# Patient Record
Sex: MALE | Race: WHITE | NOT HISPANIC OR LATINO | Employment: FULL TIME | URBAN - METROPOLITAN AREA
[De-identification: names, ages, dates, MRNs, and addresses within clinical notes are randomized per-mention and may not be internally consistent; named-entity substitution may affect disease eponyms.]

---

## 2019-01-18 ENCOUNTER — APPOINTMENT (OUTPATIENT)
Dept: CT IMAGING | Facility: CLINIC | Age: 49
End: 2019-01-18
Payer: COMMERCIAL

## 2019-01-18 ENCOUNTER — HOSPITAL ENCOUNTER (INPATIENT)
Facility: CLINIC | Age: 49
LOS: 10 days | Discharge: HOME | End: 2019-01-28
Attending: PSYCHIATRY & NEUROLOGY | Admitting: PSYCHIATRY & NEUROLOGY
Payer: COMMERCIAL

## 2019-01-18 ENCOUNTER — HOSPITAL ENCOUNTER (EMERGENCY)
Facility: CLINIC | Age: 49
Discharge: PSYCHIATRIC OR SUBSTANCE USE FACILITY | End: 2019-01-18
Attending: EMERGENCY MEDICINE | Admitting: EMERGENCY MEDICINE
Payer: COMMERCIAL

## 2019-01-18 VITALS
HEIGHT: 70 IN | TEMPERATURE: 97 F | DIASTOLIC BLOOD PRESSURE: 105 MMHG | OXYGEN SATURATION: 97 % | BODY MASS INDEX: 29.06 KG/M2 | HEART RATE: 80 BPM | WEIGHT: 203 LBS | SYSTOLIC BLOOD PRESSURE: 150 MMHG | RESPIRATION RATE: 16 BRPM

## 2019-01-18 DIAGNOSIS — F33.9 EPISODE OF RECURRENT MAJOR DEPRESSIVE DISORDER, UNSPECIFIED DEPRESSION EPISODE SEVERITY (H): Primary | ICD-10-CM

## 2019-01-18 DIAGNOSIS — F06.1 CATATONIA: ICD-10-CM

## 2019-01-18 DIAGNOSIS — F41.9 ANXIETY: ICD-10-CM

## 2019-01-18 PROBLEM — F32.A DEPRESSION: Status: ACTIVE | Noted: 2019-01-18

## 2019-01-18 LAB
ALBUMIN SERPL-MCNC: 4.1 G/DL (ref 3.4–5)
ALP SERPL-CCNC: 84 U/L (ref 40–150)
ALT SERPL W P-5'-P-CCNC: 59 U/L (ref 0–70)
AMPHETAMINES UR QL SCN: NEGATIVE
ANION GAP SERPL CALCULATED.3IONS-SCNC: 7 MMOL/L (ref 3–14)
AST SERPL W P-5'-P-CCNC: 30 U/L (ref 0–45)
BARBITURATES UR QL: NEGATIVE
BASOPHILS # BLD AUTO: 0 10E9/L (ref 0–0.2)
BASOPHILS NFR BLD AUTO: 0.4 %
BENZODIAZ UR QL: NEGATIVE
BILIRUB SERPL-MCNC: 0.7 MG/DL (ref 0.2–1.3)
BUN SERPL-MCNC: 15 MG/DL (ref 7–30)
CALCIUM SERPL-MCNC: 9.2 MG/DL (ref 8.5–10.1)
CANNABINOIDS UR QL SCN: POSITIVE
CHLORIDE SERPL-SCNC: 107 MMOL/L (ref 94–109)
CO2 SERPL-SCNC: 28 MMOL/L (ref 20–32)
COCAINE UR QL: NEGATIVE
CREAT SERPL-MCNC: 0.91 MG/DL (ref 0.66–1.25)
DIFFERENTIAL METHOD BLD: NORMAL
EOSINOPHIL # BLD AUTO: 0 10E9/L (ref 0–0.7)
EOSINOPHIL NFR BLD AUTO: 0.4 %
ERYTHROCYTE [DISTWIDTH] IN BLOOD BY AUTOMATED COUNT: 13.2 % (ref 10–15)
GFR SERPL CREATININE-BSD FRML MDRD: >90 ML/MIN/{1.73_M2}
GLUCOSE SERPL-MCNC: 114 MG/DL (ref 70–99)
HCT VFR BLD AUTO: 44.4 % (ref 40–53)
HGB BLD-MCNC: 14.7 G/DL (ref 13.3–17.7)
IMM GRANULOCYTES # BLD: 0 10E9/L (ref 0–0.4)
IMM GRANULOCYTES NFR BLD: 0.4 %
LYMPHOCYTES # BLD AUTO: 1.1 10E9/L (ref 0.8–5.3)
LYMPHOCYTES NFR BLD AUTO: 15.1 %
MCH RBC QN AUTO: 31.3 PG (ref 26.5–33)
MCHC RBC AUTO-ENTMCNC: 33.1 G/DL (ref 31.5–36.5)
MCV RBC AUTO: 95 FL (ref 78–100)
MONOCYTES # BLD AUTO: 0.8 10E9/L (ref 0–1.3)
MONOCYTES NFR BLD AUTO: 11.4 %
NEUTROPHILS # BLD AUTO: 5.1 10E9/L (ref 1.6–8.3)
NEUTROPHILS NFR BLD AUTO: 72.3 %
NRBC # BLD AUTO: 0 10*3/UL
NRBC BLD AUTO-RTO: 0 /100
OPIATES UR QL SCN: NEGATIVE
PCP UR QL SCN: NEGATIVE
PLATELET # BLD AUTO: 208 10E9/L (ref 150–450)
POTASSIUM SERPL-SCNC: 3.9 MMOL/L (ref 3.4–5.3)
PROT SERPL-MCNC: 8 G/DL (ref 6.8–8.8)
RBC # BLD AUTO: 4.7 10E12/L (ref 4.4–5.9)
SODIUM SERPL-SCNC: 142 MMOL/L (ref 133–144)
WBC # BLD AUTO: 7 10E9/L (ref 4–11)

## 2019-01-18 PROCEDURE — 99285 EMERGENCY DEPT VISIT HI MDM: CPT | Mod: 25

## 2019-01-18 PROCEDURE — 90791 PSYCH DIAGNOSTIC EVALUATION: CPT

## 2019-01-18 PROCEDURE — 12400001 ZZH R&B MH UMMC

## 2019-01-18 PROCEDURE — 70450 CT HEAD/BRAIN W/O DYE: CPT

## 2019-01-18 PROCEDURE — 80307 DRUG TEST PRSMV CHEM ANLYZR: CPT | Performed by: EMERGENCY MEDICINE

## 2019-01-18 PROCEDURE — 25000132 ZZH RX MED GY IP 250 OP 250 PS 637: Performed by: STUDENT IN AN ORGANIZED HEALTH CARE EDUCATION/TRAINING PROGRAM

## 2019-01-18 PROCEDURE — 85025 COMPLETE CBC W/AUTO DIFF WBC: CPT | Performed by: EMERGENCY MEDICINE

## 2019-01-18 PROCEDURE — 80053 COMPREHEN METABOLIC PANEL: CPT | Performed by: EMERGENCY MEDICINE

## 2019-01-18 RX ORDER — LORAZEPAM 1 MG/1
1 TABLET ORAL EVERY 8 HOURS PRN
Status: DISCONTINUED | OUTPATIENT
Start: 2019-01-18 | End: 2019-01-18 | Stop reason: HOSPADM

## 2019-01-18 RX ORDER — ACETAMINOPHEN 325 MG/1
650 TABLET ORAL EVERY 4 HOURS PRN
Status: DISCONTINUED | OUTPATIENT
Start: 2019-01-18 | End: 2019-01-18 | Stop reason: HOSPADM

## 2019-01-18 RX ORDER — TRAZODONE HYDROCHLORIDE 50 MG/1
50 TABLET, FILM COATED ORAL
Status: DISCONTINUED | OUTPATIENT
Start: 2019-01-18 | End: 2019-01-28 | Stop reason: HOSPADM

## 2019-01-18 RX ORDER — SERTRALINE HYDROCHLORIDE 100 MG/1
200 TABLET, FILM COATED ORAL EVERY MORNING
Status: ON HOLD | COMMUNITY
End: 2019-01-28

## 2019-01-18 RX ORDER — ALUMINA, MAGNESIA, AND SIMETHICONE 2400; 2400; 240 MG/30ML; MG/30ML; MG/30ML
30 SUSPENSION ORAL EVERY 4 HOURS PRN
Status: DISCONTINUED | OUTPATIENT
Start: 2019-01-18 | End: 2019-01-28 | Stop reason: HOSPADM

## 2019-01-18 RX ORDER — ACETAMINOPHEN 325 MG/1
650 TABLET ORAL EVERY 4 HOURS PRN
Status: DISCONTINUED | OUTPATIENT
Start: 2019-01-18 | End: 2019-01-28 | Stop reason: HOSPADM

## 2019-01-18 RX ORDER — OLANZAPINE 10 MG/2ML
10 INJECTION, POWDER, FOR SOLUTION INTRAMUSCULAR
Status: DISCONTINUED | OUTPATIENT
Start: 2019-01-18 | End: 2019-01-24

## 2019-01-18 RX ORDER — SERTRALINE HYDROCHLORIDE 100 MG/1
200 TABLET, FILM COATED ORAL EVERY MORNING
Status: DISCONTINUED | OUTPATIENT
Start: 2019-01-19 | End: 2019-01-28 | Stop reason: HOSPADM

## 2019-01-18 RX ORDER — OLANZAPINE 10 MG/1
10 TABLET ORAL
Status: DISCONTINUED | OUTPATIENT
Start: 2019-01-18 | End: 2019-01-24

## 2019-01-18 RX ORDER — MULTIVITAMIN,THERAPEUTIC
1 TABLET ORAL DAILY
Status: ON HOLD | COMMUNITY
End: 2019-01-28

## 2019-01-18 RX ORDER — LORAZEPAM 2 MG/1
2 TABLET ORAL ONCE
Status: COMPLETED | OUTPATIENT
Start: 2019-01-18 | End: 2019-01-18

## 2019-01-18 RX ORDER — MULTIVITAMIN,THERAPEUTIC
1 TABLET ORAL DAILY
Status: DISCONTINUED | OUTPATIENT
Start: 2019-01-19 | End: 2019-01-28 | Stop reason: HOSPADM

## 2019-01-18 RX ORDER — HYDROXYZINE HYDROCHLORIDE 25 MG/1
25 TABLET, FILM COATED ORAL EVERY 4 HOURS PRN
Status: DISCONTINUED | OUTPATIENT
Start: 2019-01-18 | End: 2019-01-28 | Stop reason: HOSPADM

## 2019-01-18 RX ADMIN — LORAZEPAM 2 MG: 2 TABLET ORAL at 22:37

## 2019-01-18 ASSESSMENT — ENCOUNTER SYMPTOMS
NAUSEA: 0
DECREASED CONCENTRATION: 1
HEADACHES: 0
VOMITING: 0
NERVOUS/ANXIOUS: 1
SLEEP DISTURBANCE: 1
APPETITE CHANGE: 1
ABDOMINAL PAIN: 0

## 2019-01-18 ASSESSMENT — MIFFLIN-ST. JEOR
SCORE: 1802.05
SCORE: 1795.25

## 2019-01-18 NOTE — PHARMACY-ADMISSION MEDICATION HISTORY
Admission medication history interview status for the 1/18/2019  admission is complete. See EPIC admission navigator for prior to admission medications     Medication history source reliability:Good    Actions taken by pharmacist (provider contacted, etc): Interviewed patient and patient's wife.     Additional medication history information not noted on PTA med list : Only on sertraline daily for prescription medications. Previously has tried bupropion and escitalopram.     Medication reconciliation/reorder completed by provider prior to medication history? No    Time spent in this activity: 15 min    Prior to Admission medications    Medication Sig Last Dose Taking? Auth Provider   multivitamin, therapeutic (THERA-VIT) TABS tablet Take 1 tablet by mouth daily 1/17/2019 at am Yes Unknown, Entered By History   sertraline (ZOLOFT) 100 MG tablet Take 200 mg by mouth every morning  1/17/2019 at am Yes Reported, Patient     Toyin Velázquezsmith, PharmD   [phone]

## 2019-01-18 NOTE — ED PROVIDER NOTES
"  History     Chief Complaint:  Anxiety    HPI   Of note, on my evaluation, patient was unable to collect thoughts to form a sentence appropriately, thus a majority of history obtained from wife.      Jorge Diana is a 48 year old male, with diagnoses of anxiety and depression, who presents with his wife to the emergency department for evaluation of increased anxiety over the last 2-3 days. Patient reports that he presents today as he would \"like to get myself back to straight\" and that he is \"scared for my family and myself because of marijuana use\". Spouse noted that patient has been having major lows, despite being on Zoloft, where it gets to the point where he \"can't speak or interact\". She notes that patient for the last 2-3 days has been \"just standing and doing nothing at home\". Spouse noted that patient has had extreme anxiety, to the severity that he is sweating and unable to do simple tasks like walking the dog. Spouse noted that patient is currently on a leave of absence from work as he had an episode around [place] time and is due to start a back-up job in 3 days, though patient noted to wife today that he \"can't do it\". Spouse noted that patient has had decreased appetite and difficulty sleeping, though denies any abdominal pain, vomiting, nausea, chest pain or headache. Of note, spouse reported that patient is being followed by [facility] and patient has been set up with a psychologist, whom he has had one visit with and a psychiatrist. She also reported that patient is going to be tested for bipolar. Spouse reports that patient has been having issues making decisions and notes that he has difficulty putting together his sentences. He denies any suicidal or homicidal ideation.    Allergies:  Amoxicillin  Penicillins     Medications:    Sertraline  Percocet  Zoloft    Past Medical History:    Fatigue  Depression  Anxiety  Unilateral inguinal hernia    Past Surgical History:  " "  Herniorrhaphy umbilical  Laparoscopic herniorrhaphy inguinal bilateral  Mouth surgery - wisdom teeth extraction    Family History:    The patient denies any relevant family medical history.     Social History:  The patient was accompanied to the ED by spouse.  Smoking Status: Yes - current some day smoker  Smokeless Tobacco: No  Alcohol Use: Yes  Drug Use: No   Marital Status:   [2]     Review of Systems   Constitutional: Positive for appetite change.   Cardiovascular: Negative for chest pain.   Gastrointestinal: Negative for abdominal pain, nausea and vomiting.   Neurological: Negative for headaches.   Psychiatric/Behavioral: Positive for decreased concentration and sleep disturbance. Negative for suicidal ideas. The patient is nervous/anxious.         Denies homicidal ideation.   All other systems reviewed and are negative.      Physical Exam   Vitals:  Patient Vitals for the past 24 hrs:   BP Temp Temp src Heart Rate Resp SpO2 Height Weight   01/18/19 1124 (!) 154/106 97  F (36.1  C) Temporal 80 16 98 % 1.778 m (5' 10\") 92.1 kg (203 lb)        Physical Exam  Vitals: reviewed by me  General: Pt seen on [facility], cooperative, and alert to conversation  Eyes: Tracking well, clear conjunctiva BL  ENT: MMM, midline trachea.   Lungs:  No tachypnea, no accessory muscle use. No respiratory distress.   CV: Rate as above, regular rhythm.    Abd: Soft, non tender, no guarding, no rebound. Non distended  MSK: no peripheral edema or joint effusion.  No evidence of trauma  Skin: No rash, normal turgor and temperature  Neuro: Clear speech and no facial droop.  BUE and BLE with SILT and 5/5 motor  Psych: Not RIS, no e/o AH/VH.  A very odd affect, delay in response time, paucity of speech noted, stares off into space frequently.    Emergency Department Course     Imaging:  Radiology findings were communicated with the patient and family who voiced understanding of the findings.  CT Head w/o " Contrast:  IMPRESSION:  Normal head CT.   Reading per radiology.     Laboratory:  Laboratory findings were communicated with the patient and family who voiced understanding of the findings.  CBC: AWNL (WBC 7.0, HGB 14.7, )  CMP: Glucose 114 (H) o/w WNL (Creatinine 0.91)    Drug Abuse Screen 77 Urine: Cannabinoids Positive (A) o/w WNL    Interventions:  Medications - No data to display     Emergency Department Course:  Nursing notes and vitals reviewed.  IV was inserted and blood was drawn for laboratory testing, results above.  The patient provided a urine sample here in the emergency department. This was sent for laboratory testing, findings above.  The patient was sent for a CT Head w/o Contrast while in the emergency department, results above.      11:40 AM: I performed an exam of the patient as documented above. History obtained from patient and spouse.   11:48 AM: Spouse and patient consent to imaging and lab studies as I do not think anxiety is the sole cause.    I personally reviewed the laboratory results with the Patient and spouse and answered all related questions prior to discharge.    Impression & Plan      Medical Decision Makin year-old male who presents to the emergency room with anxiety, catatonia and some form of altered mental status. I believe that all his altered mental status has to do with being in a current depressive mood, possibly depressive mood with psychotic features. He is alert and oriented to person, place, but mistaken on time. Apparently he has been smoking much marijuana and staring off into space far more than normal. His brief medical work up is negative here, including normal labs and CT scan of the head. He has a normal neurologic exam when he chooses to participate in the exam. I've discussed the case with the mental health professionals here, and we agreed that patient would benefit from admission to the hospital.    2:18 PM  Patient signed out to Dr. Chacko.   Patient is on a health officer hold right now, given his debilitating illness, and is medically cleared.  Awaiting bed.  Order set is in.  Paperwork filled out.    Diagnosis:    ICD-10-CM    1. Catatonia F06.1    2. Anxiety F41.9         Disposition:   Admitted     Discharge Medications:     Medication List      There are no discharge medications for this visit.         Scribe Disclosure:  I, Meme Brar, am serving as a scribe at 11:49 AM on 1/18/2019 to document services personally performed by Ralf Calderon*, based on my observations and the provider's statements to me.  1/18/2019    EMERGENCY DEPARTMENT       Ralf Calderon MD  01/18/19 2154

## 2019-01-18 NOTE — ED NOTES
"Patient report received. Patient lying in bed. Patient sat up and looked at RN when entering room. He states that he is feeling \"anxious about life decisions\". Patient reports no pain at this time, breathing is regular and non-labored.  Patient states he would like something to eat, given meal tray.  Patient offered blanket, accepted and lights dimmed for comfort.        "

## 2019-01-19 LAB
ALBUMIN UR-MCNC: 10 MG/DL
APPEARANCE UR: CLEAR
BACTERIA #/AREA URNS HPF: ABNORMAL /HPF
BILIRUB UR QL STRIP: NEGATIVE
CHOLEST SERPL-MCNC: 280 MG/DL
CK SERPL-CCNC: 137 U/L (ref 30–300)
COLOR UR AUTO: YELLOW
FOLATE SERPL-MCNC: 19.2 NG/ML
GLUCOSE UR STRIP-MCNC: 30 MG/DL
HDLC SERPL-MCNC: 51 MG/DL
HGB UR QL STRIP: NEGATIVE
HYALINE CASTS #/AREA URNS LPF: 2 /LPF (ref 0–2)
KETONES UR STRIP-MCNC: 5 MG/DL
LDLC SERPL CALC-MCNC: 201 MG/DL
LEUKOCYTE ESTERASE UR QL STRIP: NEGATIVE
MAGNESIUM SERPL-MCNC: 2.2 MG/DL (ref 1.6–2.3)
MUCOUS THREADS #/AREA URNS LPF: PRESENT /LPF
NITRATE UR QL: NEGATIVE
NONHDLC SERPL-MCNC: 229 MG/DL
PH UR STRIP: 6 PH (ref 5–7)
PHOSPHATE SERPL-MCNC: 2.9 MG/DL (ref 2.5–4.5)
RBC #/AREA URNS AUTO: 1 /HPF (ref 0–2)
SOURCE: ABNORMAL
SP GR UR STRIP: 1.03 (ref 1–1.03)
SQUAMOUS #/AREA URNS AUTO: <1 /HPF (ref 0–1)
TRIGL SERPL-MCNC: 141 MG/DL
TSH SERPL DL<=0.005 MIU/L-ACNC: 0.82 MU/L (ref 0.4–4)
UROBILINOGEN UR STRIP-MCNC: NORMAL MG/DL (ref 0–2)
VIT B12 SERPL-MCNC: 360 PG/ML (ref 193–986)
WBC #/AREA URNS AUTO: <1 /HPF (ref 0–5)

## 2019-01-19 PROCEDURE — 82746 ASSAY OF FOLIC ACID SERUM: CPT | Performed by: STUDENT IN AN ORGANIZED HEALTH CARE EDUCATION/TRAINING PROGRAM

## 2019-01-19 PROCEDURE — 82306 VITAMIN D 25 HYDROXY: CPT | Performed by: STUDENT IN AN ORGANIZED HEALTH CARE EDUCATION/TRAINING PROGRAM

## 2019-01-19 PROCEDURE — 84443 ASSAY THYROID STIM HORMONE: CPT | Performed by: STUDENT IN AN ORGANIZED HEALTH CARE EDUCATION/TRAINING PROGRAM

## 2019-01-19 PROCEDURE — 12400001 ZZH R&B MH UMMC

## 2019-01-19 PROCEDURE — 36415 COLL VENOUS BLD VENIPUNCTURE: CPT | Performed by: STUDENT IN AN ORGANIZED HEALTH CARE EDUCATION/TRAINING PROGRAM

## 2019-01-19 PROCEDURE — 83735 ASSAY OF MAGNESIUM: CPT | Performed by: STUDENT IN AN ORGANIZED HEALTH CARE EDUCATION/TRAINING PROGRAM

## 2019-01-19 PROCEDURE — 82550 ASSAY OF CK (CPK): CPT | Performed by: PSYCHIATRY & NEUROLOGY

## 2019-01-19 PROCEDURE — 25000132 ZZH RX MED GY IP 250 OP 250 PS 637: Performed by: STUDENT IN AN ORGANIZED HEALTH CARE EDUCATION/TRAINING PROGRAM

## 2019-01-19 PROCEDURE — 36415 COLL VENOUS BLD VENIPUNCTURE: CPT | Performed by: PSYCHIATRY & NEUROLOGY

## 2019-01-19 PROCEDURE — 25000132 ZZH RX MED GY IP 250 OP 250 PS 637: Performed by: PSYCHIATRY & NEUROLOGY

## 2019-01-19 PROCEDURE — 80061 LIPID PANEL: CPT | Performed by: STUDENT IN AN ORGANIZED HEALTH CARE EDUCATION/TRAINING PROGRAM

## 2019-01-19 PROCEDURE — 84100 ASSAY OF PHOSPHORUS: CPT | Performed by: STUDENT IN AN ORGANIZED HEALTH CARE EDUCATION/TRAINING PROGRAM

## 2019-01-19 PROCEDURE — 82607 VITAMIN B-12: CPT | Performed by: STUDENT IN AN ORGANIZED HEALTH CARE EDUCATION/TRAINING PROGRAM

## 2019-01-19 PROCEDURE — 81001 URINALYSIS AUTO W/SCOPE: CPT | Performed by: STUDENT IN AN ORGANIZED HEALTH CARE EDUCATION/TRAINING PROGRAM

## 2019-01-19 PROCEDURE — 99223 1ST HOSP IP/OBS HIGH 75: CPT | Mod: AI | Performed by: PSYCHIATRY & NEUROLOGY

## 2019-01-19 RX ORDER — LORAZEPAM 1 MG/1
1-2 TABLET ORAL EVERY 4 HOURS PRN
Status: DISCONTINUED | OUTPATIENT
Start: 2019-01-19 | End: 2019-01-28 | Stop reason: HOSPADM

## 2019-01-19 RX ADMIN — LORAZEPAM 2 MG: 1 TABLET ORAL at 11:25

## 2019-01-19 RX ADMIN — OLANZAPINE 10 MG: 10 TABLET, FILM COATED ORAL at 20:37

## 2019-01-19 RX ADMIN — SERTRALINE HYDROCHLORIDE 200 MG: 100 TABLET ORAL at 09:58

## 2019-01-19 RX ADMIN — THERA TABS 1 TABLET: TAB at 09:58

## 2019-01-19 ASSESSMENT — ACTIVITIES OF DAILY LIVING (ADL)
ORAL_HYGIENE: INDEPENDENT
HYGIENE/GROOMING: INDEPENDENT
DRESS: INDEPENDENT

## 2019-01-19 NOTE — ED NOTES
[facility] here for transport. Report given and patient loaded up into EMS truck.

## 2019-01-19 NOTE — PROGRESS NOTES
Patient 49 yo  male transferred to station [facility] where he presented with catatonic-like symptoms.  Pt reportedly unable to hold conversation, replying with delayed kimmy answers.  (Pt continues thus far here.)   Pt's wife reports pt having similar episode in Dec. And has been on leave from work since.  (Pt's Zoloft increased at that time with positive effects.)   Wife also reports patient using cannabis for couple years for anxiety.          Pt cooperative and directable; albeit, with staff urging.   Pt reports not having any SI/SIB, but very delayed with answer.  Pt took 2mg Ativan per doctor's order.  Pt able to swallow without difficulty, but delayed with action.

## 2019-01-19 NOTE — PROGRESS NOTES
"  Initial Psychosocial Assessment    I have reviewed the chart, met with the patient, and developed Care Plan. Information for assessment was obtained from: Pt, medical record                                                                  voluntary    Presenting Problem:   Per ED:   Jorge Diana is a 48 year old male, with diagnoses of anxiety and depression, who presents with his wife to the emergency department for evaluation of increased anxiety over the last 2-3 days. Patient reports that he presents today as he would \"like to get myself back to straight\" and that he is \"scared for my family and myself because of marijuana use\". Spouse noted that patient has been having major lows, despite being on Zoloft, where it gets to the point where he \"can't speak or interact\". She notes that patient for the last 2-3 days has been \"just standing and doing nothing at home\". Spouse noted that patient has had extreme anxiety, to the severity that he is sweating and unable to do simple tasks like walking the dog. Spouse noted that patient is currently on a leave of absence from work as he had an episode around [place] time and is due to start a back-up job in 3 days, though patient noted to wife today that he \"can't do it\". Spouse noted that patient has had decreased appetite and difficulty sleeping, though denies any abdominal pain, vomiting, nausea, chest pain or headache. Of note, spouse reported that patient is being followed by [facility] and patient has been set up with a psychologist, whom he has had one visit with and a psychiatrist. She also reported that patient is going to be tested for bipolar. Spouse reports that patient has been having issues making decisions and notes that he has difficulty putting together his sentences. He denies any suicidal or homicidal ideation.    Writer met with pt and his wife during visiting hours. Pt was given ativan with improvement. He was able to answer questions " readily. His wife reports pt is doing much better then at the time of admission.       History of Mental Health and Chemical Dependency:  This is pt's first hospitalization    marijuana use    Significant Life Events   (Illness, Abuse, Trauma, Death): not assessed    Family: raised in [place] with intact family, has one brother,  with two children.    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 CTC to contact pt's wife for additional information and meet with pt to discuss discharge plans. Staff to provide safe environment and provide a therapeutic milieu.

## 2019-01-19 NOTE — H&P
"Admitted:     01/18/2019      CONTACTS:  Andreina Infante at [facility] and associate is the patient's prescriber.      CHIEF COMPLAINT:  \"I am scared.\"      HISTORY OF PRESENT ILLNESS:  History obtained from patient interview and chart review.      The patient is a 48-year-old male with past medical history of rather uncomplicated major depressive disorder, and possibly anxiety disorder who presented on 01/18/2019 with worsening anxiety, the year, and motor symptoms suggestive of catatonia in the setting of daily marijuana use, which is not uncommon for the patient.      Review of records shows that the patient presented to the ED at [facility] with his wife.  Most of the history was obtained from the patient's wife.  She reports that the patient has been increasingly anxious of the past 2-3 days.  She reported how the patient has been having periods lately where he \"cannot speak or interact.\"  For the last 2 days, the patient has been \"just standing and doing nothing at home.\"  He has also had extreme anxiety where he is unable to complete any tasks.  He has also difficulty sleeping and decreased appetite.  Apparently, the patient was going to be tested for bipolar disorder.  She notes overall that the patient is having difficulty putting sentences together.      Also reports that she feels like the patient has had much milder episodes like this over the past 2 years, in which he would have some episodes of staring, or be a bit slow to respond to questions, and this may go on for a few hours, even a day.  In December, he experienced these symptoms for 3-4 days.  At that time his psych provider increased his Zoloft to 200 mg per day, which seemed to help.  The patient's most recent symptoms began about 3 days ago and have worsened to the point where he is unable to converse or function.      Upon interview of the patient, the patient was found to be in his room standing, filling out a menu.  The patient was rather " stationary and was able to briefly converse, taking moments where he would pause and not respond.  The patient had increased latency in between his responses.  When questioned, patient often would not respond.  The patient was able to say that he was scared and anxious.  The patient was also oriented at this time.  The patient was able to follow simple or motor commands.  Engaged in testing for catatonia with the patient having elements suggestive of catatonia.  The patient unable to vocalize any immediate concerns at this point.  Informed the patient that medications will be used at this time.  The patient consents to these medications, namely Ativan.      The risks, benefits, alternatives and side effects discussed and understood by the patient and other caregivers.      PSYCHIATRIC NERVOUS SYSTEM:  Positive for anxiety, fear, insomnia, reduced activity, fixed position, mannerisms, delayed speech, thought blocking, and difficulty engaging in conversation.      MEDICAL REVIEW OF SYSTEMS:  Review of systems is negative, other than noted in HPI.      PSYCHIATRIC HISTORY:  The patient has prior diagnosis of major depressive disorder, and a possible anxiety disorder.  The patient has no prior hospitalizations.  It does not like the patient has any prior suicide attempt, self-injurious behavior.  Unclear which medications the patient has tried in the past, with his most current medication being Zoloft.  It is quite possible that this is the only antidepressant he has tried in the past.      SUBSTANCE USE HISTORY:  The patient has smoked marijuana on a frequent basis for the past few years.  He had smoked prior to his symptoms most recently occurring.  He uses this medication to assist with anxiety.  It does not appear the patient uses any other substances at this time.  No history of chemical dependency treatment.      PAST MEDICAL HISTORY:  Fatigue and unilateral inguinal hernia.      PAST SURGICAL HISTORY:     1.   Herniorrhaphy, umbilical.    2.  Laparoscopic herniorrhaphy, inguinal, bilateral.    3.  Mouth surgery, wisdom teeth extraction.      ALLERGIES:  PENICILLIN, REACTION IS ITCHING AND RASH.      MEDICATIONS:  No current outpatient medications on file.      SOCIAL HISTORY:  The patient works full-time.  The patient is .  The patient is currently on leave of absence from his work.  No other available social history at this time.      FAMILY HISTORY:  No family history on file.        LABORATORY DATA:       Results for orders placed or performed during the hospital encounter of 01/18/19 (from the past 24 hour(s))   CBC with platelets differential   Result Value Ref Range    WBC 7.0 4.0 - 11.0 10e9/L    RBC Count 4.70 4.4 - 5.9 10e12/L    Hemoglobin 14.7 13.3 - 17.7 g/dL    Hematocrit 44.4 40.0 - 53.0 %    MCV 95 78 - 100 fl    MCH 31.3 26.5 - 33.0 pg    MCHC 33.1 31.5 - 36.5 g/dL    RDW 13.2 10.0 - 15.0 %    Platelet Count 208 150 - 450 10e9/L    Diff Method Automated Method     % Neutrophils 72.3 %    % Lymphocytes 15.1 %    % Monocytes 11.4 %    % Eosinophils 0.4 %    % Basophils 0.4 %    % Immature Granulocytes 0.4 %    Nucleated RBCs 0 0 /100    Absolute Neutrophil 5.1 1.6 - 8.3 10e9/L    Absolute Lymphocytes 1.1 0.8 - 5.3 10e9/L    Absolute Monocytes 0.8 0.0 - 1.3 10e9/L    Absolute Eosinophils 0.0 0.0 - 0.7 10e9/L    Absolute Basophils 0.0 0.0 - 0.2 10e9/L    Abs Immature Granulocytes 0.0 0 - 0.4 10e9/L    Absolute Nucleated RBC 0.0    Comprehensive metabolic panel   Result Value Ref Range    Sodium 142 133 - 144 mmol/L    Potassium 3.9 3.4 - 5.3 mmol/L    Chloride 107 94 - 109 mmol/L    Carbon Dioxide 28 20 - 32 mmol/L    Anion Gap 7 3 - 14 mmol/L    Glucose 114 (H) 70 - 99 mg/dL    Urea Nitrogen 15 7 - 30 mg/dL    Creatinine 0.91 0.66 - 1.25 mg/dL    GFR Estimate >90 >60 mL/min/[1.73_m2]    GFR Estimate If Black >90 >60 mL/min/[1.73_m2]    Calcium 9.2 8.5 - 10.1 mg/dL    Bilirubin Total 0.7 0.2 - 1.3 mg/dL     Albumin 4.1 3.4 - 5.0 g/dL    Protein Total 8.0 6.8 - 8.8 g/dL    Alkaline Phosphatase 84 40 - 150 U/L    ALT 59 0 - 70 U/L    AST 30 0 - 45 U/L   CT Head w/o Contrast    Narrative    CT OF THE HEAD WITHOUT CONTRAST 1/18/2019 12:32 PM     COMPARISON: None.    HISTORY:  Altered level of consciousness (LOC), unexplained.    TECHNIQUE: Axial CT images of the head from the skull base to the  vertex were acquired without IV contrast.    FINDINGS:  The ventricles and basal cisterns are within normal limits  in configuration. There is no midline shift. There are no extra-axial  fluid collections.  Gray-white differentiation is well maintained.     No intracranial hemorrhage, mass or recent infarct.    The visualized paranasal sinuses are well aerated. There is no  mastoiditis. There are no fractures of the visualized bones.       Impression    IMPRESSION:  Normal head CT.     Radiation dose for this scan was reduced using automated exposure  control, adjustment of the mA and/or kV according to patient size, or  iterative reconstruction technique.    RAJESH MANCUSO MD   Drug abuse screen 77 urine ([place], [place], [place])   Result Value Ref Range    Amphetamine Qual Urine Negative NEG^Negative    Barbiturates Qual Urine Negative NEG^Negative    Benzodiazepine Qual Urine Negative NEG^Negative    Cannabinoids Qual Urine Positive (A) NEG^Negative    Cocaine Qual Urine Negative NEG^Negative    Opiates Qualitative Urine Negative NEG^Negative    PCP Qual Urine Negative NEG^Negative       VITAL SIGNS:  Temperature 98 degrees Fahrenheit, respirations 16, weight 89.8 kg, BMI 27.62.        PSYCHIATRIC EXAMINATION:    This is an alert and oriented 48-year-old male who is demonstrating symptoms consistent with catatonia.  Most notably, the patient has immobility, is verbally less responsive, has staring spells, and mannerisms.  The patient's attitude is guarded.  Eye contact is poor.  Mood is described as anxious and fearful.  Affect is flat.   Speech is increased latency with frequent pauses.  Psychomotor behavior demonstrated as above as having elements of catatonia.  Thought process is disjointed.  No current loose associations.  Thought content is positive for potential delusional thought, although clear at this point.  Insight is impaired.  Judgment is impaired.  Oriented to person, place and time.  Attention and concentration are intact.  Recent and remote memory is somewhat impaired.  Language is English, with appropriate syntax and vocabulary.  Fund of knowledge is limited at this time.  Muscle strength and tone are grossly normal.  Gait and station are grossly       PHYSICAL EXAMINATION:  Please see ED assessment done by the ED physician on 01/18/2019.      ASSESSMENT:  The patient is a 48-year-old male with previous history of major depressive disorder and possible anxiety disorder who presents with symptoms consistent with catatonia in the setting of increasing stressors at work and daily marijuana use.  The patient's history is rather uncomplicated with the patient never being hospitalized in the past and being managed on an outpatient basis for his depression.  It does not appear that the patient has had many medication trials.  The patient may have been experiencing some subtle features of catatonia over the past few years at times when his depression worsened.  The patient has recently had an increase in his Zoloft to 200 mg, which helped out with a period of symptoms suggestive of catatonia earlier in December.  The patient is now further decompensated.  Unclear if there are any potential biological contributions at this point.  Current mental status exam supports the patient being catatonic with the patient having elements of stupor, mutism, staring, and mannerisms.  This is consistent with a diagnosis of catatonia.  Psychosocial factors include his increasing stressors at work.  The patient will benefit from inpatient hospitalization and  optimization of medication regimen.        REASON FOR INPATIENT HOSPITALIZATION:  This patient is at increased risk of self-harm due to being unable to care for himself due to his poor self-care due to being catatonic.  The patient disposition pending clinical stabilization, medication optimization, and formation of safe discharge plan.       The patient has been provided with Ativan overnight per challenge.  We will have nursing staff examine the patient to see if the patient improves.  Morning staff will determine if scheduled Ativan should be appropriate at that time.  The patient's home medications will be continued at this time.  He may benefit from an augmentation of his current antidepressant regimen or switching to a new agent.  The patient may be cautiously tried an augmentation of an antipsychotic with something like Seroquel.  Alternatively, an antipsychotic could be avoided at this time with the patient being treated potentially with something like Wellbutrin, while using Ativan if this does help the patient out.      PRINCIPAL PSYCHIATRIC DIAGNOSES:   -- Catatonia.   -- Major depressive disorder, recurrent, severe, with potential psychotic features, although unclear.       SECONDARY PSYCHIATRIC DIAGNOSES:     -- Unspecified anxiety disorder.   -- Marijuana use disorder.      PLAN: Admit to unit 22 with attending physician, Dr. Padmini Sanchez.      LEGAL STATUS:  Voluntary.      SAFETY ASSESSMENT:  Code 1 fall precaution status 15.      MEDICATIONS:     Outpatient medications held:  None.   Outpatient medications continued:     -- Zoloft 200 mg daily.   -- Multivitamin.   New medications initiated:   -- Olanzapine 5 mg p.o./IM p.r.n. severe agitation or psychosis.   -- Tylenol p.r.n.   -- Mylanta p.r.n.   -- Hydroxyzine p.r.n.   -- Milk of magnesia p.r.n.   -- Trazodone p.r.n.      The patient will be treated in therapy currently with appropriate individual and group therapies.      MEDICAL DIAGNOSES:      #General health maintenance.   -- Multivitamin daily.      CONSULTS:  None.      LABORATORY DATA:   Magnesium, phosphorus, TSH, UA, vitamin B12, vitamin D, folate, lipid panel.      DISPOSITION:  Unknown pending medication management clinical stabilization.   The patient will be staffed with attending physician in the a..         SANDRA MANRIQUE MD       As dictated by KIERAN CHADWICK,          D: 2019   T: 2019   MT: ROXI      Name:     JOSELITO VALENTE   MRN:      [id number]        Account:      [id number]   :      1970        Admitted:     2019                   Document: [id number]

## 2019-01-19 NOTE — PROGRESS NOTES
01/18/19 2004   Patient Belongings   Did you bring any home meds/supplements to the hospital?  No   Patient Belongings none   Belongings Search No belongings   Clothing Search Yes   Second Staff Clem LINDER

## 2019-01-20 PROCEDURE — 12400001 ZZH R&B MH UMMC

## 2019-01-20 PROCEDURE — 25000132 ZZH RX MED GY IP 250 OP 250 PS 637: Performed by: STUDENT IN AN ORGANIZED HEALTH CARE EDUCATION/TRAINING PROGRAM

## 2019-01-20 PROCEDURE — 25000132 ZZH RX MED GY IP 250 OP 250 PS 637: Performed by: PSYCHIATRY & NEUROLOGY

## 2019-01-20 RX ADMIN — LORAZEPAM 1 MG: 1 TABLET ORAL at 11:12

## 2019-01-20 RX ADMIN — SERTRALINE HYDROCHLORIDE 200 MG: 100 TABLET ORAL at 11:11

## 2019-01-20 RX ADMIN — THERA TABS 1 TABLET: TAB at 11:12

## 2019-01-20 RX ADMIN — OLANZAPINE 10 MG: 10 TABLET, FILM COATED ORAL at 21:43

## 2019-01-20 RX ADMIN — LORAZEPAM 2 MG: 1 TABLET ORAL at 17:43

## 2019-01-20 ASSESSMENT — ACTIVITIES OF DAILY LIVING (ADL)
DRESS: INDEPENDENT
LAUNDRY: WITH SUPERVISION
HYGIENE/GROOMING: INDEPENDENT
ORAL_HYGIENE: INDEPENDENT
HYGIENE/GROOMING: INDEPENDENT
ORAL_HYGIENE: INDEPENDENT
DRESS: INDEPENDENT

## 2019-01-20 NOTE — PROGRESS NOTES
Pt out of room more this shift. Took phone call and had visit with wife. Pt appears tense, but less tense than last evening. When asked if they felt they were improving pt stated than things were still pretty rough. Denies SI. Conversation limited to one word answers.      01/19/19 2200   Behavioral Health   Hallucinations denies / not responding to hallucinations   Thinking poor concentration   Orientation situation, disoriented   Insight poor   Judgement impaired   Eye Contact into space   Affect tense   Mood mood is calm   Physical Appearance/Attire attire appropriate to age and situation   Hygiene well groomed   Suicidality other (see comments)  (denies )   1. Wish to be Dead No   2. Non-Specific Active Suicidal Thoughts  No   Self Injury other (see comment)  (denies )   Activity withdrawn   Speech other (see comments)  (limited to one word or short answers)   Medication Sensitivity no observed side effects   Psychomotor / Gait balanced;steady   Psycho Education   Type of Intervention 1:1 intervention   Response participates, initiates socially appropriate   Hours 0.5   Treatment Detail check in    Group Therapy Session   Group Attendance refused to attend group session   Time Session Began 1700   Time Session Ended 1730   Activities of Daily Living   Hygiene/Grooming independent   Oral Hygiene independent   Dress independent   Room Organization independent   Activity   Activity Assistance Provided independent

## 2019-01-20 NOTE — PROGRESS NOTES
"Psychiatry Cross Cover Note    S: Notified by staff that patient reportedly snores loudly at night and his roommate got no sleep last night because of this. Given another patient's willingness to have a roommate today, staff is requesting patient be moved into single room.    O: BP (!) 145/109   Pulse 103   Temp 97.7  F (36.5  C) (Tympanic)   Resp 16   Ht 1.803 m (5' 11\")   Wt 89.8 kg (198 lb)   SpO2 97%   BMI 27.62 kg/m      A/P:  Given staff concerns in context of ongoing apparent catatonia, will place \"No roommate\" order for patient to enable more appropriate patient room arrangements. Primary team to evaluate on Tuesday.      Jose Goode MD  PGY-2 Psychiatry Resident              "

## 2019-01-20 NOTE — PROGRESS NOTES
Wife called writer after visit to report pt seemed paranoid.   Wife suspects increased Zoloft the cause, and would like Team to address.   (Res. Alerted)

## 2019-01-20 NOTE — PLAN OF CARE
Jorge improved, but continued delayed speech and responses this AM-wife phoned this AM again expressing concern is reaction to Zoloft 200 mg dose-meds held until Dr Rodriguez in-dose continued as scheduled-In addition, Ativan 1 mg PO given 1112-Jorge this afternoon able to engage in conversation-more spontaneous movement-visited with wife this afternoon who agrees significant improvement-Jorge does c/o poor memory and having difficulty organizing events-freq appears tense and anxious-spent most of day in his room

## 2019-01-21 LAB — DEPRECATED CALCIDIOL+CALCIFEROL SERPL-MC: 26 UG/L (ref 20–75)

## 2019-01-21 PROCEDURE — 12400001 ZZH R&B MH UMMC

## 2019-01-21 PROCEDURE — 25000132 ZZH RX MED GY IP 250 OP 250 PS 637: Performed by: PSYCHIATRY & NEUROLOGY

## 2019-01-21 PROCEDURE — H2032 ACTIVITY THERAPY, PER 15 MIN: HCPCS

## 2019-01-21 PROCEDURE — 25000132 ZZH RX MED GY IP 250 OP 250 PS 637: Performed by: STUDENT IN AN ORGANIZED HEALTH CARE EDUCATION/TRAINING PROGRAM

## 2019-01-21 RX ADMIN — THERA TABS 1 TABLET: TAB at 09:27

## 2019-01-21 RX ADMIN — LORAZEPAM 2 MG: 1 TABLET ORAL at 10:37

## 2019-01-21 RX ADMIN — SERTRALINE HYDROCHLORIDE 200 MG: 100 TABLET ORAL at 09:28

## 2019-01-21 ASSESSMENT — ACTIVITIES OF DAILY LIVING (ADL)
HYGIENE/GROOMING: INDEPENDENT
HYGIENE/GROOMING: INDEPENDENT
ORAL_HYGIENE: INDEPENDENT
ORAL_HYGIENE: INDEPENDENT
DRESS: INDEPENDENT
DRESS: INDEPENDENT

## 2019-01-21 NOTE — PROGRESS NOTES
Attended 3 of 3 music therapy groups. Interventions focused on improving socialization, self-expression, and mood. Pt participated in music game intervention and was attentive. He had a flat affect, but brightened briefly upon approach. Interacted with peers. Appeared calm and content at end of group.    Pt had a flat affect during group listening intervention, but appeared calm when listening to preferred music. He conversed with peers intermittently throughout group.     During blues songwriting intervention, pt became more involved in songwriting process as group progressed. He created lyrics about nature, and appeared to be focused as group progressed. By end of group, he was smiling and appeared content.

## 2019-01-21 NOTE — PROGRESS NOTES
"Pt seemed to be doing a lot better on unit today, more spontaneous and deliberate in actions. Pt states they are about 80% back to being themseleves.  Socialized with peers in small amounts occasionally. During check in pt endorsed continued paranoia although felt it was doing better as he is getting more comfortable with environment and figuring out which people are patients and which are staff. Pt had paranoia today regarding cleaning people, saying that, \"their movements seemed staged\" and \"I didn't know if they were really a part of the staff team.\" Pt was tangential in conversation and often could not answer questions directly. For example when asked if their concentration was improving pt stated that they were still having a hard time concentrating and then continued to talk about how they used to like making dinner, but their kids don't help doing dishes and grocery shopping takes a long time etc.   Pt stated they were having a hard time saying what they were thinking as well as being able to have a conversation. Pt also added that they have always been a poor communicator, \"I just try to grasp on to any word a person is saying and try to give it meaning, but usually nothing makes sense to me and I just say I don't know, its really frustrating.\" Pt had good appetite and is sleeping well. Denies side effects with any current medications.      01/20/19 2100   Behavioral Health   Hallucinations denies / not responding to hallucinations   Thinking paranoid   Orientation person: oriented;place: oriented;date: oriented;time: oriented   Insight poor   Judgement impaired   Eye Contact at examiner   Affect blunted, flat   Mood mood is calm   Physical Appearance/Attire attire appropriate to age and situation   Hygiene well groomed   Suicidality other (see comments)  (denies )   1. Wish to be Dead No   2. Non-Specific Active Suicidal Thoughts  No   Self Injury other (see comment)  (denies )   Elopement (no indicators) "   Activity isolative;withdrawn   Speech tangential;clear;coherent   Medication Sensitivity no observed side effects   Psychomotor / Gait balanced;steady   Psycho Education   Type of Intervention 1:1 intervention   Response participates, initiates socially appropriate   Hours 0.5   Treatment Detail check in   Activities of Daily Living   Hygiene/Grooming independent   Oral Hygiene independent   Dress independent   Room Organization independent   Activity   Activity Assistance Provided independent

## 2019-01-21 NOTE — PLAN OF CARE
Jorge came to window to ask for medications.  He said that he has high blood pressure at home as well about like this.  He advocated for his meds, answered questions without hesitation and was connected during conversation.  He smiled a couple of times, had relaxed facial muscles and without writer having met him before, signs of catatonia not picked up right now.  Will assess to offer Ativan.  Talked to Dr. Rodriguez and Dr. Lamberto Sparks re: the elevated blood pressure.  They noted it is running about the same with diastolic elevated to 100 to 110.  New order put in to reduce vital signs to bid with new parameters for calling doctor to notify if elevated.

## 2019-01-22 PROCEDURE — 12400001 ZZH R&B MH UMMC

## 2019-01-22 PROCEDURE — G0177 OPPS/PHP; TRAIN & EDUC SERV: HCPCS

## 2019-01-22 PROCEDURE — 99232 SBSQ HOSP IP/OBS MODERATE 35: CPT | Mod: GC | Performed by: PSYCHIATRY & NEUROLOGY

## 2019-01-22 PROCEDURE — 25000132 ZZH RX MED GY IP 250 OP 250 PS 637: Performed by: STUDENT IN AN ORGANIZED HEALTH CARE EDUCATION/TRAINING PROGRAM

## 2019-01-22 RX ORDER — ARIPIPRAZOLE 2 MG/1
2 TABLET ORAL DAILY
Status: DISCONTINUED | OUTPATIENT
Start: 2019-01-22 | End: 2019-01-23

## 2019-01-22 RX ORDER — ATOMOXETINE 10 MG/1
10 CAPSULE ORAL DAILY
Status: DISCONTINUED | OUTPATIENT
Start: 2019-01-23 | End: 2019-01-28 | Stop reason: HOSPADM

## 2019-01-22 RX ADMIN — ARIPIPRAZOLE 2 MG: 2 TABLET ORAL at 20:51

## 2019-01-22 RX ADMIN — ACETAMINOPHEN 650 MG: 325 TABLET, FILM COATED ORAL at 20:51

## 2019-01-22 RX ADMIN — THERA TABS 1 TABLET: TAB at 08:42

## 2019-01-22 RX ADMIN — SERTRALINE HYDROCHLORIDE 200 MG: 100 TABLET ORAL at 08:42

## 2019-01-22 ASSESSMENT — MIFFLIN-ST. JEOR: SCORE: 1804.32

## 2019-01-22 ASSESSMENT — ACTIVITIES OF DAILY LIVING (ADL)
ORAL_HYGIENE: INDEPENDENT
HYGIENE/GROOMING: INDEPENDENT
DRESS: INDEPENDENT
LAUNDRY: WITH SUPERVISION

## 2019-01-22 NOTE — PROGRESS NOTES
"Pt good day today. Yesterday pt stated they felt they were about 80% back to being themselves and stated that they feel like they are still in the \"80 range\" today. Pt states they are still feeling nervous on the unit and has paranoia at times. Participated in groups today. Pt gives short responses to questions, seems like pt has a hard time tracking conversation at times. Had visit from wife this evening which seemed to go well.      01/21/19 2000   Behavioral Health   Hallucinations denies / not responding to hallucinations   Thinking paranoid   Orientation place: oriented;date: oriented;time: oriented   Memory baseline memory   Insight insight appropriate to situation;insight appropriate to events   Judgement impaired   Eye Contact at examiner   Affect full range affect   Mood mood is calm   Physical Appearance/Attire attire appropriate to age and situation   Hygiene well groomed   Suicidality other (see comments)  (denies)   1. Wish to be Dead No   2. Non-Specific Active Suicidal Thoughts  No   Self Injury other (see comment)  (denies )   Activity other (see comment)  (in milieu)   Speech clear;coherent   Medication Sensitivity no stated side effects;no observed side effects   Psychomotor / Gait balanced;steady   Psycho Education   Type of Intervention 1:1 intervention   Response participates, initiates socially appropriate   Hours 0.5   Treatment Detail check in    Group Therapy Session   Group Attendance attended group session   Time Session Began 1700   Time Session Ended 1715   Total Time (minutes) 15   Group Type community   Patient Participation/Contribution cooperative with task   Activities of Daily Living   Hygiene/Grooming independent   Oral Hygiene independent   Dress independent   Room Organization independent   Activity   Activity Assistance Provided independent     "

## 2019-01-22 NOTE — PLAN OF CARE
Initially seen by OT on this date. Jorge  attended 2 of 3 OT groups today. Pleasant, quiet, polite. Minimal verbal participation in group this date but cooperative and calm. Will be given a written self-assessment upon increased group attendance. Pt will be given a self-assessment, to be reviewed upon more interaction. More observation needed to complete initial evaluation at this time.

## 2019-01-22 NOTE — PLAN OF CARE
BEHAVIORAL TEAM DISCUSSION    Participants: Padmini Sanchez MD; Pennie Everett MD; Michelle FERNANDEZ; medical students Lauro PANTOJA; Ralf JOYCE and Batsheva GIMENEZ  Progress: minimal  Continued Stay Criteria/Rationale: Patient is newly admitted with symptoms of psychosis/catatonia.  Evaluation in process  Medical/Physical: elevated blood pressure  Precautions:   Behavioral Orders   Procedures    Code 1 - Restrict to Unit    Fall precautions    Routine Programming     As clinically indicated    Status 15     Every 15 minutes.     Plan: Psychiatric evaluation; medication evaluation and adjustments as appropriate. Ensure appropriate aftercare is in place.  Rationale for change in precautions or plan: initial plan.

## 2019-01-22 NOTE — PROGRESS NOTES
"    ----------------------------------------------------------------------------------------------------------  [facility], [place]   Psychiatric Progress Note     Interim History:   The patient's care was discussed with the treatment team and chart notes were reviewed.     Sleep: 7 hours  Scheduled meds: adherent  PRN meds: Ativan 1 mg x 4, Zyprexa 10 mg x 2 (over 3-day weekend)    Per Staff report: Patient's catatonic symptoms improved over the course of the weekend. He is now conversant and partakes in group activities on the aguirre.     Patient interview:    Jorge was sitting on his bed. He is cooperative and conversant throughout the interview. The patient states over all he is doing better compared to when he was first admitted.     He reports a number of stressors that he feels are contributing to his depression. 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 While he has succeeded at the job, he often feels overwhelmed by the amount of information that he must learn every day, and he feels it is too much to learn. He finds the job itself satisfying and is often praised at work, but he feels that he cannot keep up with everything he is supposed to learn and that the prospect of learning it all is too daunting. Additionally, he reports a long history of \"his mind not letting me retain information\" that he feels impedes him on a daily basis. He states that in conversation and in learning environments people will tell him things and then shortly after he will forget the information as if it were completely not retained. This has gone on for his entire life. He states that he was evaluated for ADHD about a year and a half ago and was started on Adderall, but this did not provide much help.     He states that his mood today is \"bad.\" He denies suicidal ideation but notes that he sometimes feels that he \"would be better off dead.\" He has been on Sertraline 200 mg daily " "for the last month but feels this has been somewhat inadequate. On further questioning, Jorge notes that he has \"never felt good about himself.\" He has some episodes at home when he stares out the window for extended periods of time with racing thoughts. He has self-medicated with marijuana on a daily basis, smoking from 2-5 times per week and going through an eighth every 3 or so weeks. He feels a great deal of guilt over the frequency of his marijuana use and notes that he occasionally smokes when on breaks at work. He denies sleep disturbance.    When asked about symptoms of elevated mood, he states there are times when he feels well. He feels more motivated to cook dinner or  a new hobby. He notes that he wanted to try ice fishing with his son and recently bought a few hundred dollars worth of fishing equipment. He denies extravagant spending sprees. His wife was called for collateral information regarding this.   She states there will be week-long periods when Jorge is more upbeat and will cook more often. He seems to make quick decisions without thinking, and he seems more impulsive like wanting to make plans that are not feasible. She denied that he has prolonged periods without sleep or risk-taking behavior.     The risks, benefits, alternatives and side effects of any medication changes have been discussed and are understood by the patient and other caregivers.    Psychiatric Review of systems:     The Review of Systems is negative other than noted in the HPI         Psychiatric Examination:   BP (!) 140/97 (BP Location: Left arm)   Pulse 82   Temp 98.1  F (36.7  C) (Oral)   Resp 16   Ht 1.803 m (5' 11\")   Wt 90.7 kg (200 lb)   SpO2 97%   BMI 27.89 kg/m    Weight is 200 lbs 0 oz  Body mass index is 27.89 kg/m .    Appearance:  awake, alert, adequately groomed, dressed in hospital scrubs and appeared as age stated  Attitude:  cooperative  Eye Contact:  good  Mood:  \"bad\"  Affect:  mood " congruent  Speech:  clear, coherent and normal prosody  Psychomotor Behavior:  no evidence of tardive dyskinesia, dystonia, or tics  Thought Process:  logical and linear  Associations:  no loose associations  Thought Content:  no evidence of suicidal ideation or homicidal ideation and no evidence of psychotic thought  Insight:  good  Judgment:  intact  Oriented to:  time, person, and place  Attention Span and Concentration:  limited, patient must be redirected back to the conversation multiple times  Recent and Remote Memory:  intact  Language: speaks fluent English  Fund of Knowledge: appropriate  Muscle Strength and Tone: normal  Gait and Station: Normal     Assessment    Diagnostic Impression: Jorge Diana, a 48 year old male, with history of major depressive disorder, anxiety, and daily cannabis use, presented to [facility] ED due to increased depression with likely catatonia in the context of increased stress at work. His presentation is most concerning for major depressive disorder with catatonia. This is supported by his improvement with Ativan over the course of the weekend, though he has only modest improvement in his mood. Bipolar disorder was also considered. The patient does not have any symptoms of bassam shana, nor are his periods of elevated mood quite consistent with hypomania. The patient is very distressed over longstanding issues with attention, and he needs to be redirected multiple times during the interview. ADHD is very possible, and he would likely benefit from treatment for this as well.     Hospital course: Jorge Diana was admitted to station 22 as a voluntary patient. His catatonic symptoms improved over the course of the weekend, and the patient was started on additional medications    Plan     Today's Changes:   - Start Abilify 2mg daily   - Start Strattera 10 mg daily     Principal Diagnosis:   #Major depressive disorder, with catatonia    Secondary psychiatric diagnoses of  concern this admission:  #Anxiety  #Attention deficit hyperactivity disorder     Medications:   Straterra 10mg daily   Abilify 2mg daily   Sertraline 200 mg PO every day  Multivitamin 1 tablet PO every day    Acetaminophen 650 mg PO Q4H PRN  Hydroxyzine 25 mg PO Q4H PRN  Lorazepam 1-2 mg PO Q4H PRN  Olanzapine 10 mg PO or IM Q2H PRN  Trazodone 50 mg PO at bedtime   Mylanta/Maalox  Milk of magnesia      Plan:   - Patient will be treated in therapeutic milieu with appropriate individual and group therapies as described.  The patient requires continued inpatient hospitalization and disposition is pending due to medication optimization, inpatient stabilization, and appropriate discharge planning.     Legal Status: Voluntary    Safety Assessment:   Behavioral Orders   Procedures     Code 1 - Restrict to Unit     Fall precautions     Routine Programming     As clinically indicated     Status 15     Every 15 minutes.         This patient was seen and discussed with my attending physician.   I, Ralf Matthews, MS4, saw and examined the patient in the presence of resident physician Dr. Pennie Everett DO, and attending physician Dr. Padmini Sanchez.     I was present with the medical student who participated in the service and in the documentation of the note. I have verified the history and personally performed the physical exam and medical decision making. I agree with the assessment and plan of care as documented in the note. Dr. Pennie Everett DO, MBA, MS    Dr. Pennie Everett DO, MBA, MS  PGY-1 Psychiatry Resident Physician     Attending Physician Attestation:  This patient has been seen and evaluated by me, Padmini Snachez.  I have discussed this patient with the house staff team including the resident and medical student and I agree with the findings and plan in this note.    I have reviewed today's vital signs, medications, labs and imaging. Padmini Sanchez MD

## 2019-01-22 NOTE — PROGRESS NOTES
Pt attended some groups today, mostly withdrawn from peers. Pt reports mood at a 3/10, feels 'low'. Pt stated he has poor concentration, and he is having difficulty with his short-term memory. Pt reported he often cannot remember conversations or questions he wants to bring to team because he will forget them by the time he can write them down. Pt also stated 'he feels like a chameleon here, I don't know how to act so I will watch other patients and act like them'.

## 2019-01-23 PROCEDURE — 12400001 ZZH R&B MH UMMC

## 2019-01-23 PROCEDURE — G0177 OPPS/PHP; TRAIN & EDUC SERV: HCPCS

## 2019-01-23 PROCEDURE — 25000132 ZZH RX MED GY IP 250 OP 250 PS 637: Performed by: STUDENT IN AN ORGANIZED HEALTH CARE EDUCATION/TRAINING PROGRAM

## 2019-01-23 PROCEDURE — 99232 SBSQ HOSP IP/OBS MODERATE 35: CPT | Mod: GC | Performed by: PSYCHIATRY & NEUROLOGY

## 2019-01-23 RX ORDER — ARIPIPRAZOLE 2 MG/1
2 TABLET ORAL DAILY
Status: DISCONTINUED | OUTPATIENT
Start: 2019-01-24 | End: 2019-01-24

## 2019-01-23 RX ADMIN — THERA TABS 1 TABLET: TAB at 09:00

## 2019-01-23 RX ADMIN — SERTRALINE HYDROCHLORIDE 200 MG: 100 TABLET ORAL at 09:00

## 2019-01-23 RX ADMIN — ATOMOXETINE HYDROCHLORIDE 10 MG: 10 CAPSULE ORAL at 09:00

## 2019-01-23 RX ADMIN — ARIPIPRAZOLE 2 MG: 2 TABLET ORAL at 10:30

## 2019-01-23 RX ADMIN — ACETAMINOPHEN 650 MG: 325 TABLET, FILM COATED ORAL at 09:00

## 2019-01-23 ASSESSMENT — ACTIVITIES OF DAILY LIVING (ADL)
DRESS: INDEPENDENT
ORAL_HYGIENE: INDEPENDENT
DRESS: INDEPENDENT
ORAL_HYGIENE: INDEPENDENT
LAUNDRY: WITH SUPERVISION
HYGIENE/GROOMING: INDEPENDENT
HYGIENE/GROOMING: INDEPENDENT

## 2019-01-23 NOTE — PLAN OF CARE
"Jorge stated that his mood is good and has been.  He denied any roller coaster feelings of up and down and describes in words that sound like he is stable.  He does not appear anxious,but instead looks relaxed, facial muscles relaxed with a slight intensity with speaking and engaging another.  He does admit to delayed responses still thought better.  He understands the Stratterta medications, it's reason for use and the goal to decrease the delayed responses.  He questioned whether he should have Abilify again this am so soon after last evening and will talk to the team before he takes it.  He did not sleep well last night, he said, and while the room was cold and he felt cold he is not sure why he did not sleep.  \"The Abilify can cause restlessness.\".  He did not ask the staff checking on him overnight for an extra blanket or to adjust the heat in room.  He said he would tonight if same thing happened.  He has a headache this am, he rates it a #3 on scale of 1-10 ten being the worst.\".  He does have TMJ and \"I never think much about it whether the TMJ causes headache or headache comes on it's own.\".  He is non verbally congruent with feeling happy in mood, some energy and some delay in answering questions like the source of his headache.  His wife Saima called and would like to talk to someone from the team after meeting with her .  She wonders about discharge plans and date possibilities.  She gave her cell phone number, \"I am at an open desk (at work) and don't want to take the call there.  The cell phone reception though is sometimes not good.\".  Her number is  (cell).  RONALDO signed for her by Jorge.  "

## 2019-01-23 NOTE — PROGRESS NOTES
"INITIAL OT ASSESSMENT    Pt attended 2 out of 3 OT groups offered. Pt actively participated in occupational therapy clinic. Pt was able to ask for assistance as needed, and independently initiated a complex, multi-step, goal-directed task. Pt demonstrated good focus, planning, and problem solving. Organized in his task approach. Pt appeared comfortable interacting with peers. Pt actively participated in a structured occupational therapy group with a focus on connecting song titles to life events and personal feelings. Using a list of song titles, pt identified You Can't Always Get What You Want, A Face in the Crowd, Against the Wind (\"I have too much going on\"), Imagine (connected this to current political events, explaining that he has difficulty imagining peace), and Sound of Silence (\"because I like quiet\") as song titles that relate to his life, as well as I Can See Clearly Now as a song title that indicates something about his future. Pt then completed a visual scanning task while listening to identified songs to facilitate focus and mood elevation. Pleasant, cooperative, and engaged throughout groups today. Appeared to brighten with social interaction.    OT Self-Assessment  Pt was given and completed a written self-assessment. Cited \"I went into the ER because I am feeling bad and overwhelmed with everything\" as the reason for current hospitalization. Identified \"All of my family, friends, co-workers\" as primary social supports. When asked what staff can do to be most helpful during hospitalization, pt responded \"I do not know.\"     Goals prioritized for hospitalization (selected from list): Self-awareness, getting through this, self-compassion, focus and concentration, self-advocacy, finding jae, and forgiveness    Goals prioritized for hospitalization (self-described): \"To get help in accepting myself.\"    OT staff explained the purpose of pt being involved in current treatment plan.    Plan: Encourage " ongoing attendance as able to meet self-stated goals, implement positive coping skills, engage in graded opportunities for success, and provide assessment ongoing.        01/23/19 1600   Clinical Impression   Affect Appropriate to situation   Orientation Oriented to person, place and time   Appearance and ADLs General cleanliness observed in most areas   Attention to Internal Stimuli No observed signs   Interaction Skills Interacts appropriately with staff;Interacts appropriately with peers   Ability to Communicate Needs Does so with prompts   Verbal Content Articulate;Clear;Appropriate to topic   Ability to Maintain Boundaries Maintains appropriate physical boundaries;Maintains appropriate verbal boundaries   Participation Independently participates   Concentration Concentrates 50 minutes   Ability to Concentrate Without difficulty;With structure   Follows and Comprehends Directions Independently follows multi-step directions   Memory Needs further assessment   Organization Independently organizes all tasks   Decision Making Independent   Planning and Problem Solving Independently plans ahead   Ability to Apply and Learn Concepts Applies within group structure   Frustrations / Stress Tolerance Independently identifies sources of frustration/stress   Level of Insight Insightful into needs, issues, goals   Self Esteem Poor self esteem;Needs further assessment   Social Supports Identifies utilizing supports

## 2019-01-23 NOTE — PROGRESS NOTES
"    ----------------------------------------------------------------------------------------------------------  [facility], [place]   Psychiatric Progress Note     Interim History:   The patient's care was discussed with the treatment team and chart notes were reviewed.     Sleep: 6.5 hours per chart, patient states he slept poorly  Scheduled meds: adherent  PRN meds: Tylenol x1    Per Staff report: More participation in groups last night, pleasant.     Patient interview:    The patient was interviewed in the resident workroom. He is pleasant and cooperative. He received his first dose of Abilify last night and thinks it may have caused him to sleep poorly. However, he states that his mood is greatly improved today.     Jorge was noted to have a possible anxiety disorder upon admission. When asked about anxiety today, he states there are times he does feel anxious. This occurs most acutely when he is in situations when he has to make decisions, and he offers as an example that it is hard and stressful to make dinner for his family and he will \"freeze\" when he has to decide. He also notes there are sometimes situations when his palms get sweaty or he wakes up with cold sweats at night, but he is not sure if this represents anxiety. Jorge states that he is poorly in tune with his feelings, and he is not often sure when what he is feeling constitutes anxiety. There     The risks, benefits, alternatives and side effects of any medication changes have been discussed and are understood by the patient and other caregivers.    Psychiatric Review of systems:     The Review of Systems is negative other than noted in the HPI         Psychiatric Examination:   BP (!) 140/97 (BP Location: Left arm)   Pulse 82   Temp 97.8  F (36.6  C)   Resp 16   Ht 1.803 m (5' 11\")   Wt 90.7 kg (200 lb)   SpO2 97%   BMI 27.89 kg/m    Weight is 200 lbs 0 oz  Body mass index is 27.89 kg/m .    Appearance:  " awake, alert, adequately groomed and dressed in hospital scrubs  Attitude:  cooperative  Eye Contact:  good  Mood:  good  Affect:  appropriate and in normal range and mood congruent  Speech:  clear, coherent  Psychomotor Behavior:  no evidence of tardive dyskinesia, dystonia, or tics  Thought Process:  logical and linear  Associations:  no loose associations  Thought Content:  no evidence of suicidal ideation or homicidal ideation and no evidence of psychotic thought  Insight:  good  Judgment:  intact  Oriented to:  time, person, and place  Attention Span and Concentration:  fair, patient often does not track during the interview and must be reminded what questions he was just asked were  Recent and Remote Memory:  intact  Language: speaks fluent English with proper syntax  Fund of Knowledge: appropriate  Muscle Strength and Tone: normal  Gait and Station: Normal     Assessment    Diagnostic Impression: Jorge Diana, a 48 year old male, with history of major depressive disorder, anxiety, and daily cannabis use, presented to [facility] ED due to increased depression with likely catatonia in the context of increased stress at work. His presentation is most concerning for major depressive disorder with catatonia. This is supported by his improvement with Ativan over the course of the weekend, though he has only modest improvement in his mood. Bipolar disorder was also considered. The patient does not have any symptoms of bassam shana, nor are his periods of elevated mood quite consistent with hypomania. The patient is very distressed over longstanding issues with attention, and he needs to be redirected multiple times during the interview. ADHD is very possible, and he would likely benefit from treatment for this as well.     Hospital course: Jorge Diana was admitted to station 22 as a voluntary patient. His catatonic symptoms improved over the course of the weekend, and the patient was started on  additional medications with good results for his mood.    Plan     Today's Changes:   -Administer Abilify in the morning, see how this affects his sleep  -Administer MOCA tomorrow morning     Principal Diagnosis:   #Major depressive disorder, with catatonia    Secondary psychiatric diagnoses of concern this admission:   #Anxiety  #Attention deficit hyperactivity disorder  #Possible neurocognitive deficit/neuropsychiatric disorder    Medications:   Straterra 10mg daily   Abilify 2mg daily   Sertraline 200 mg PO every day  Multivitamin 1 tablet PO every day     Acetaminophen 650 mg PO Q4H PRN  Hydroxyzine 25 mg PO Q4H PRN  Lorazepam 1-2 mg PO Q4H PRN  Olanzapine 10 mg PO or IM Q2H PRN  Trazodone 50 mg PO at bedtime   Mylanta/Maalox  Milk of magnesia    Plan:   - Patient will be treated in therapeutic milieu with appropriate individual and group therapies as described.  The patient requires continued inpatient hospitalization and disposition is pending due to medication optimization, inpatient stabilization, and appropriate discharge planning.     Legal Status: Voluntary    Safety Assessment:   Behavioral Orders   Procedures     Code 1 - Restrict to Unit     Fall precautions     Routine Programming     As clinically indicated     Status 15     Every 15 minutes.         This patient was seen and discussed with my attending physician.   I, Ralf Matthews, MS4, saw and examined the patient in the presence of resident physician Dr. Pennie Everett DO, and attending physician Dr. Padmini Sanchez.    I was present with the medical student who participated in the service and in the documentation of the note. I have verified the history and personally performed the physical exam and medical decision making. I agree with the assessment and plan of care as documented in the note. Dr. Pennie Everett DO, MBA, MS      Dr. Pennie Everett DO, MBA, MS  PGY-1 Psychiatry Resident Physician       Attestation:  This patient has been seen  and evaluated by me, Padmini Sanchez.  I have discussed this patient with the house staff team including the resident and medical student and I agree with the findings and plan in this note.    I have reviewed today's vital signs, medications, labs and imaging. Padmini Sanchez MD

## 2019-01-24 ENCOUNTER — APPOINTMENT (OUTPATIENT)
Dept: MRI IMAGING | Facility: CLINIC | Age: 49
End: 2019-01-24
Payer: COMMERCIAL

## 2019-01-24 PROCEDURE — G0177 OPPS/PHP; TRAIN & EDUC SERV: HCPCS

## 2019-01-24 PROCEDURE — 12400001 ZZH R&B MH UMMC

## 2019-01-24 PROCEDURE — 70551 MRI BRAIN STEM W/O DYE: CPT

## 2019-01-24 PROCEDURE — 25000132 ZZH RX MED GY IP 250 OP 250 PS 637: Performed by: STUDENT IN AN ORGANIZED HEALTH CARE EDUCATION/TRAINING PROGRAM

## 2019-01-24 PROCEDURE — 99232 SBSQ HOSP IP/OBS MODERATE 35: CPT | Mod: GC | Performed by: PSYCHIATRY & NEUROLOGY

## 2019-01-24 PROCEDURE — 25000132 ZZH RX MED GY IP 250 OP 250 PS 637: Performed by: PSYCHIATRY & NEUROLOGY

## 2019-01-24 RX ORDER — OLANZAPINE 5 MG/1
5 TABLET ORAL
Status: DISCONTINUED | OUTPATIENT
Start: 2019-01-24 | End: 2019-01-28 | Stop reason: HOSPADM

## 2019-01-24 RX ORDER — LORAZEPAM 1 MG/1
1 TABLET ORAL
Status: DISCONTINUED | OUTPATIENT
Start: 2019-01-24 | End: 2019-01-28 | Stop reason: HOSPADM

## 2019-01-24 RX ORDER — OLANZAPINE 10 MG/2ML
10 INJECTION, POWDER, FOR SOLUTION INTRAMUSCULAR
Status: DISCONTINUED | OUTPATIENT
Start: 2019-01-24 | End: 2019-01-28 | Stop reason: HOSPADM

## 2019-01-24 RX ADMIN — OLANZAPINE 10 MG: 10 TABLET, FILM COATED ORAL at 00:15

## 2019-01-24 RX ADMIN — SERTRALINE HYDROCHLORIDE 200 MG: 100 TABLET ORAL at 09:23

## 2019-01-24 RX ADMIN — LORAZEPAM 1 MG: 1 TABLET ORAL at 18:24

## 2019-01-24 RX ADMIN — LORAZEPAM 1 MG: 1 TABLET ORAL at 11:06

## 2019-01-24 RX ADMIN — ARIPIPRAZOLE 2 MG: 2 TABLET ORAL at 09:23

## 2019-01-24 RX ADMIN — LORAZEPAM 1 MG: 1 TABLET ORAL at 22:19

## 2019-01-24 RX ADMIN — TRAZODONE HYDROCHLORIDE 50 MG: 50 TABLET ORAL at 00:15

## 2019-01-24 RX ADMIN — TRAZODONE HYDROCHLORIDE 50 MG: 50 TABLET ORAL at 21:49

## 2019-01-24 RX ADMIN — ATOMOXETINE HYDROCHLORIDE 10 MG: 10 CAPSULE ORAL at 09:23

## 2019-01-24 ASSESSMENT — ACTIVITIES OF DAILY LIVING (ADL)
HYGIENE/GROOMING: INDEPENDENT
LAUNDRY: WITH SUPERVISION
ORAL_HYGIENE: INDEPENDENT
HYGIENE/GROOMING: INDEPENDENT
DRESS: INDEPENDENT
ORAL_HYGIENE: INDEPENDENT
LAUNDRY: WITH SUPERVISION
DRESS: INDEPENDENT

## 2019-01-24 ASSESSMENT — MIFFLIN-ST. JEOR: SCORE: 1817.93

## 2019-01-24 NOTE — PROGRESS NOTES
"Pt in milieu most of shift, napping at times. Had visit from family in the evening. Pt unsure of their mood, state they are having a hard time concentrating and feel frustrated. Pt hopeful new medication begins to work but does not feel it is doing anything yet, states they feel \"blah\". Pt still feeling uncomfortable in environment, unsure what to do with self, although state they feel that way at home as well. Denies SI thoughts. At times pt is unable to verbalize how they feel or what they are thinking.      01/23/19 2000   Behavioral Health   Hallucinations denies / not responding to hallucinations   Thinking other (see comment)  (trouble with concentration, not distractable )   Orientation person: oriented;place: oriented;date: oriented;time: oriented   Memory short term   Insight admits / accepts;other (see comment)  (fair )   Judgement intact   Eye Contact at examiner   Affect full range affect   Mood anxious;other (see comments)  (\"blah\" \"frustrated\" )   Physical Appearance/Attire attire appropriate to age and situation   Hygiene well groomed   Suicidality other (see comments)  (denies )   1. Wish to be Dead No   2. Non-Specific Active Suicidal Thoughts  No   Self Injury other (see comment)  (denies )   Elopement (no indicators )   Activity other (see comment)  (in milieu )   Speech clear;coherent   Medication Sensitivity no stated side effects;no observed side effects   Psychomotor / Gait balanced;steady   Psycho Education   Type of Intervention 1:1 intervention   Response participates, initiates socially appropriate   Hours 0.5   Treatment Detail check in    Group Therapy Session   Group Attendance refused to attend group session   Time Session Began 1645   Time Session Ended 1715   Total Time (minutes) 30   Group Type community   Patient Participation/Contribution cooperative with task   Activities of Daily Living   Hygiene/Grooming independent   Oral Hygiene independent   Dress independent   Room " Organization independent   Activity   Activity Assistance Provided independent

## 2019-01-24 NOTE — PROGRESS NOTES
"    ----------------------------------------------------------------------------------------------------------  [facility], [place]   Psychiatric Progress Note     Interim History:   The patient's care was discussed with the treatment team and chart notes were reviewed.     Sleep: 6 hours  Scheduled meds: adherent/non-adherent  PRN meds: Tylenol x1, Zyprexa x1, Trazodone x1    Per Staff report: Patient feeling \"blah\" and had difficulty sleeping due to racing thoughts. His family visited yesterday, which appeared to go well.     Patient interview:  Today Jorge states he feels \"lost, sad, nervous, and anxious.\" He had difficulty sleeping last night due to racing thoughts regarding his current situation in the hospital. He notes that he is still very frustrated that it feels like he does not retain the things that are said to him, and he feels he cannot learn things as quickly as he should be able to. Additionally, he notes he is frustrated because he feels that he is not honest. He had previously told the treatment team that he has only smoked marijuana at work once, but he acknowledges that he does this frequently. He states that his wife knows that he smokes marijuana, and she has told him that she is not bothered by this because it calms him down.     The patient has been diagnosed with ADHD in the past and was treated with Adderall. He thinks the Adderall helped for a couple of days but then he mostly felt \"jittery\" from it.      Of note, there were a couple of portions during the interview when part of the treatment plan was explained to the patient, and he responded \"you're losing me\" and stated that he could not follow the explanations.     Collateral Obtained From Jorge's wife:  Jorge's wife was contacted regarding his attention and retention problems. She notes that he has felt this way for a long time, stating \"he really feels he can't retain information.\" She states he remembers " "visual information very easily, such as the contours of a golf course, but he can't remember 3 items on a grocery list. She states he was told in high school that he had low reading comprehension, and this has bothered him his whole life. She feels that he has had a hard time accepting that this is part of who he is. She states that his anxiety is worse when he has to answer questions.    The risks, benefits, alternatives and side effects of any medication changes have been discussed and are understood by the patient and other caregivers.    Psychiatric Review of systems:     The Review of Systems is negative other than noted in the HPI         Psychiatric Examination:   BP (!) 137/103   Pulse 91   Temp 98  F (36.7  C) (Oral)   Resp 16   Ht 1.803 m (5' 11\")   Wt 92.1 kg (203 lb)   SpO2 97%   BMI 28.31 kg/m    Weight is 203 lbs 0 oz  Body mass index is 28.31 kg/m .    Appearance:  awake, alert, adequately groomed and dressed in hospital scrubs  Attitude:  cooperative  Eye Contact:  fair  Mood:   \"lost, sad, nervous, and anxious\"  Affect:  mood congruent  Speech:  clear, coherent and increased speech latency  Psychomotor Behavior:  no evidence of tardive dyskinesia, dystonia, or tics  Thought Process:  logical and linear  Associations:  no loose associations  Thought Content:  no evidence of suicidal ideation or homicidal ideation and no evidence of psychotic thought  Insight:  good  Judgment:  intact  Oriented to:  time, person, and place  Attention Span and Concentration:  fair  Recent and Remote Memory:  intact  Language: speaks fluent English with normal Syntax  Fund of Knowledge: appropriate  Muscle Strength and Tone: normal  Gait and Station: Normal     Assessment      Diagnostic Impression: Jorge Diana, a 48 year old male, with history of major depressive disorder, anxiety, and daily cannabis use, presented to [facility] ED due to increased depression with likely catatonia in the context of " increased stress at work. The patient's depressive symptoms have not resolved with Abilify, and his sleep is worse, so we will discontinue that medication at this time. The patient has a long history of self-described poor attention and retention of spoken and written information. This may represent a symptom of his depression or other pathology such as ADHD, neurocognitive disorder, learning disability and merits further testing. There is also a question of if his catatonic symptoms were properly treated over the weekend as he only received 4 doses of Ativan. To further assess, we will obtain a brain MRI to evaluate for structural pathology. MoCA screening score is 26, but formal Neuropsych testing may also be warranted.      Hospital course: Jorge Diana was admitted to station 22 as a voluntary patient. His catatonic symptoms improved over the course of the weekend with Ativan.  Ativan was discontinued on 01/22/19 due to resolution of catatonia at that time and Jorge was started on Abilify.  However, Jorge was unable to sleep with the addition of the Abilify and there were no noted improvements in his mood, so the Abilify was discontinued.  Of note, Jorge's confusion and lack of ability to answer questions, concentrate, and convey his feelings seemed to worsen by 01/24/19.  There were concerns for either worsening catatonia or underlying cognitive deficits.  A MOCA was conducted on 01/24/19 and Ativan was restarted at 1mg TID.     Plan     Today's Changes:   -Discontinue Abilify  -Ativan 1 mg PO TID  -Obtain MRI Brain  -Perform MoCA      Principal Diagnosis:   #Major depressive disorder, with catatonia     Secondary psychiatric diagnoses of concern this admission:   #Anxiety  #Attention deficit hyperactivity disorder  #Possible neurocognitive deficit/neuropsychiatric disorder  -MoCA score: 26     Medications:   Straterra 10mg daily   Sertraline 200 mg PO every day  Ativan 1 mg PO TID PO  Multivitamin 1 tablet  PO every day     Acetaminophen 650 mg PO Q4H PRN  Hydroxyzine 25 mg PO Q4H PRN  Olanzapine 10 mg PO or IM Q2H PRN  Trazodone 50 mg PO at bedtime   Mylanta/Maalox  Milk of magnesia    Laboratory/Imaging:   -MRI Brain pending    Plan:   - Patient will be treated in therapeutic milieu with appropriate individual and group therapies as described.  The patient requires continued inpatient hospitalization and disposition is pending due to medication optimization, inpatient stabilization, and appropriate discharge planning.     Legal Status: Voluntary    Safety Assessment:   Behavioral Orders   Procedures     Code 1 - Restrict to Unit     Fall precautions     Routine Programming     As clinically indicated     Status 15     Every 15 minutes.         This patient was seen and discussed with my attending physician.   I, Ralf Matthews, MS4, saw and examined the patient in the presence of resident physician Dr. Pennie Everett DO, and attending physician Dr. Padmini Sanchez.    I was present with the medical student who participated in the service and in the documentation of the note. I have verified the history and personally performed the physical exam and medical decision making. I agree with the assessment and plan of care as documented in the note. Dr. Pennie Everett DO, MBA, MS    Dr. Pennie Everett DO, MBA, MS  PGY-1 Psychiatry Resident Physician     Attending Physician Attestation:    This patient has been seen and evaluated by me, Padmini Sanchez.  I have discussed this patient with the house staff team including the resident and medical student and I agree with the findings and plan in this note.    I have reviewed today's vital signs, medications, labs and imaging. Padmini Sanchez MD

## 2019-01-24 NOTE — PROGRESS NOTES
"Pt was asking for \"something to help me sleep and to stop the racing thoughts.\" Pt also endorsed anxiety r/t racing thoughts and not being able to sleep. Pt received prn Zyprexa and Hydroxyzine. Pt then went back to bed.   "

## 2019-01-24 NOTE — PROGRESS NOTES
Pt was visible in the milieu, social with peers, attended groups and participated. Pt denies SI/SIB, hallucinations, and medication side affects. Pt stated today had been up and down. Some pauses during conversations. Pt was calm and cooperative throughout the day. .       01/24/19 1420   Behavioral Health   Hallucinations denies / not responding to hallucinations   Thinking distractable   Orientation person: oriented;date: oriented;place: oriented;time: oriented   Memory baseline memory   Insight admits / accepts   Judgement impaired   Eye Contact at examiner   Affect full range affect   Mood anxious   Physical Appearance/Attire attire appropriate to age and situation   Hygiene well groomed   Suicidality other (see comments)  (denies)   1. Wish to be Dead No   2. Non-Specific Active Suicidal Thoughts  No   Self Injury other (see comment)  (denies)   Activity other (see comment)  (visible in the milieu )   Speech clear;coherent   Medication Sensitivity no stated side effects;no observed side effects   Psychomotor / Gait balanced;steady   Psycho Education   Type of Intervention 1:1 intervention   Response participates, initiates socially appropriate   Hours 0.5   Treatment Detail check in    Activities of Daily Living   Hygiene/Grooming independent   Oral Hygiene independent   Dress independent   Laundry with supervision   Room Organization independent   Activity   Activity Assistance Provided independent

## 2019-01-25 PROCEDURE — 25000132 ZZH RX MED GY IP 250 OP 250 PS 637: Performed by: STUDENT IN AN ORGANIZED HEALTH CARE EDUCATION/TRAINING PROGRAM

## 2019-01-25 PROCEDURE — G0177 OPPS/PHP; TRAIN & EDUC SERV: HCPCS

## 2019-01-25 PROCEDURE — 99232 SBSQ HOSP IP/OBS MODERATE 35: CPT | Mod: GC | Performed by: PSYCHIATRY & NEUROLOGY

## 2019-01-25 PROCEDURE — 12400001 ZZH R&B MH UMMC

## 2019-01-25 RX ADMIN — ATOMOXETINE HYDROCHLORIDE 10 MG: 10 CAPSULE ORAL at 08:38

## 2019-01-25 RX ADMIN — LORAZEPAM 1 MG: 1 TABLET ORAL at 13:31

## 2019-01-25 RX ADMIN — SERTRALINE HYDROCHLORIDE 200 MG: 100 TABLET ORAL at 08:38

## 2019-01-25 RX ADMIN — THERA TABS 1 TABLET: TAB at 08:38

## 2019-01-25 RX ADMIN — LORAZEPAM 1 MG: 1 TABLET ORAL at 08:38

## 2019-01-25 RX ADMIN — LORAZEPAM 1 MG: 1 TABLET ORAL at 17:49

## 2019-01-25 ASSESSMENT — ACTIVITIES OF DAILY LIVING (ADL)
HYGIENE/GROOMING: INDEPENDENT
DRESS: INDEPENDENT
LAUNDRY: WITH SUPERVISION
ORAL_HYGIENE: INDEPENDENT
ORAL_HYGIENE: INDEPENDENT
HYGIENE/GROOMING: INDEPENDENT
DRESS: INDEPENDENT

## 2019-01-25 NOTE — PLAN OF CARE
Jorge requested printed copy of all medications given to him since admission-had been trying to write all doses in journal-MAR for last 7 days printed and reviewed-Jorge required repeated review to understand which medications were given when-was ultimately able to understand and recognize what had been given-less anxious today-continues to participate in grps

## 2019-01-25 NOTE — PROGRESS NOTES
"    ----------------------------------------------------------------------------------------------------------  [facility], [place]   Psychiatric Progress Note     Interim History:   The patient's care was discussed with the treatment team and chart notes were reviewed.     Sleep: 7 hours  Scheduled meds: adherent   PRN meds: Ativan x1, Trazodone x1    Per Staff report: Patient participated in therapies and is interactive with others on the floor. Seems slow to answer questions.    Patient interview:    Jorge states he slept well and that his mood is \"really good.\" He is pleasant and cooperative, and there are fewer episodes during the interview where he states he did not understand the questions asked of him. He feels the scheduled Ativan may be helping him. He continues to express frustration over his attention/retention abilities in conversation, noting specifically that he retains very little when he reads and that this has been a problem for him his whole life. He states that he can remember visual things very easily. He is relieved that his MRI returned normal.     Jorge's wife Laury was updated.  She is agreeable with the plan and is hoping for discharge on Monday or Tuesday.  She agrees that Jorge is doing much better today.  She states that Jorge has a psychiatry appointment at [facility] on 02/05 and a therapy appointment on 02/07 and 07/14.  She also states that he has Bipolar testing with Miguel Dawkins and is wondering if he should keep this appointment.     The risks, benefits, alternatives and side effects of any medication changes have been discussed and are understood by the patient and other caregivers.    Psychiatric Review of systems:     The Review of Systems is negative other than noted in the HPI         Psychiatric Examination:   BP (!) 148/101   Pulse 77   Temp 97.9  F (36.6  C) (Oral)   Resp 14   Ht 1.803 m (5' 11\")   Wt 92.1 kg (203 lb)   SpO2 99%   BMI " 28.31 kg/m    Weight is 203 lbs 0 oz  Body mass index is 28.31 kg/m .    Appearance:  awake, alert, adequately groomed and dressed in hospital scrubs  Attitude:  cooperative  Eye Contact:  good  Mood:  better  Affect:  mood congruent  Speech:  clear, coherent and normal prosody  Psychomotor Behavior:  no evidence of tardive dyskinesia, dystonia, or tics  Thought Process:  logical and linear  Associations:  no loose associations  Thought Content:  no evidence of suicidal ideation or homicidal ideation and no evidence of psychotic thought  Insight:  good  Judgment:  intact  Oriented to:  time, person, and place  Attention Span and Concentration:  intact  Recent and Remote Memory:  intact  Language: speaks fluent English with normal syntax  Fund of Knowledge: appropriate  Muscle Strength and Tone: normal  Gait and Station: Normal     Assessment      Diagnostic Impression: Jorge Diana, a 48 year old male, with history of major depressive disorder, anxiety, and daily cannabis use, presented to [facility] ED due to increased depression with likely catatonia in the context of increased stress at work. The patient's depressive symptoms have not resolved with Abilify, and his sleep is worse, so we will discontinue that medication at this time. The patient has a long history of self-described poor attention and retention of spoken and written information. This may represent a symptom of his depression or other pathology such as ADHD, neurocognitive disorder, learning disability and merits further testing. There is also a question of if his catatonic symptoms were properly treated over the weekend as he only received 4 doses of Ativan. To further assess, we will obtain a brain MRI to evaluate for structural pathology. MoCA screening score is 26, but formal Neuropsych testing may also be warranted.      Hospital course: Jorge Diana was admitted to station 22 as a voluntary patient. His catatonic symptoms improved  over the course of the weekend with Ativan.  Ativan was discontinued on 01/22/19 due to resolution of catatonia at that time and Jorge was started on Abilify.  However, Jorge was unable to sleep with the addition of the Abilify and there were no noted improvements in his mood, so the Abilify was discontinued.  Of note, Jorge's confusion and lack of ability to answer questions, concentrate, and convey his feelings seemed to worsen by 01/24/19.  There were concerns for either worsening catatonia or underlying cognitive deficits.  A MOCA was conducted on 01/24/19, which was within normal limits, and Ativan was restarted at 1mg TID. MRI of the brain was obtained on 1/24/19, and this was normal. Given the ongoing nature of his cognitive symptoms, Neuropsych consultation was made on 1/24/19.     Plan     Principal Diagnosis:   #Major depressive disorder, with catatonia     Secondary psychiatric diagnoses of concern this admission:  #Anxiety  #Attention deficit hyperactivity disorder  #Possible neurocognitive deficit/neuropsychiatric disorder  -MoCA score: 26     Medications:   Straterra 10mg daily   Sertraline 200 mg PO every day  Ativan 1 mg PO TID   Multivitamin 1 tablet PO every day     Acetaminophen 650 mg PO Q4H PRN  Hydroxyzine 25 mg PO Q4H PRN  Olanzapine 10 mg PO or IM Q2H PRN  Trazodone 50 mg PO at bedtime   Mylanta/Maalox  Milk of magnesia    Consults:  Neuropsych has been consulted for possible underlying cognitive delay    Laboratory/Imaging:     CT Head w/o Contrast  Impression:  Normal head CT.   Per Radiology.    MRI Brain w/o Contrast  Impression:  Normal brain MRI.  Per Radiology.    Plan:   - Patient will be treated in therapeutic milieu with appropriate individual and group therapies as described.  The patient requires continued inpatient hospitalization and disposition is pending due to medication optimization, inpatient stabilization, and appropriate discharge planning.     Legal Status:  Voluntary    Safety Assessment:   Behavioral Orders   Procedures     Code 2     Fall precautions     Routine Programming     As clinically indicated     Status 15     Every 15 minutes.         This patient was seen and discussed with my attending physician.   I, Ralf Matthews, MS4, saw and examined the patient in the presence of resident physician Dr. Pennie Everett DO, and attending physician Dr. Padmini Sanchez.     I was present with the medical student who participated in the service and in the documentation of the note. I have verified the history and personally performed the physical exam and medical decision making. I agree with the assessment and plan of care as documented in the note. Dr. Pennie Everett DO, ANAHI, MS    Dr. Pennie Everett DO, MBA, MS  PGY-1 Psychiatry Resident Physician     Attestation:  This patient has been seen and evaluated by me, Padmini Sanchez.  I have discussed this patient with the house staff team including the resident and medical student and I agree with the findings and plan in this note.    I have reviewed today's vital signs, medications, labs and imaging. Padmini Sanchez MD

## 2019-01-25 NOTE — PLAN OF CARE
Jorge  attended 3 of 3 OT groups today. Initially hesistant during OT group but eventually chose a simple creative project to work on and worked quietly throughout the session on that. During a discussion group this afternoon he was more forthcoming than he has been in recent days about his personal struggles. He discussed recent tension with his wife about his marijuana use and also some feelings of guilt about having lied to her about his use at various points. Reported some ambivalence about his relationship to marijuana and the role it plays in his personal and professional life.

## 2019-01-26 PROCEDURE — 25000132 ZZH RX MED GY IP 250 OP 250 PS 637: Performed by: STUDENT IN AN ORGANIZED HEALTH CARE EDUCATION/TRAINING PROGRAM

## 2019-01-26 PROCEDURE — 12400001 ZZH R&B MH UMMC

## 2019-01-26 RX ADMIN — LORAZEPAM 1 MG: 1 TABLET ORAL at 18:03

## 2019-01-26 RX ADMIN — TRAZODONE HYDROCHLORIDE 50 MG: 50 TABLET ORAL at 21:43

## 2019-01-26 RX ADMIN — ACETAMINOPHEN 650 MG: 325 TABLET, FILM COATED ORAL at 12:22

## 2019-01-26 RX ADMIN — LORAZEPAM 1 MG: 1 TABLET ORAL at 08:26

## 2019-01-26 RX ADMIN — ATOMOXETINE HYDROCHLORIDE 10 MG: 10 CAPSULE ORAL at 08:26

## 2019-01-26 RX ADMIN — THERA TABS 1 TABLET: TAB at 08:26

## 2019-01-26 RX ADMIN — SERTRALINE HYDROCHLORIDE 200 MG: 100 TABLET ORAL at 08:26

## 2019-01-26 RX ADMIN — LORAZEPAM 1 MG: 1 TABLET ORAL at 12:22

## 2019-01-26 ASSESSMENT — ACTIVITIES OF DAILY LIVING (ADL)
DRESS: INDEPENDENT
HYGIENE/GROOMING: INDEPENDENT
ORAL_HYGIENE: INDEPENDENT

## 2019-01-26 NOTE — PROGRESS NOTES
Pt more withdrawn this shift, laying in bed at 1630, got up for dinner at 1815 and then went to sleep at 1830.     01/25/19 2200   Behavioral Health   Hallucinations denies / not responding to hallucinations   Thinking intact   Orientation person: oriented;place: oriented;date: oriented;time: oriented   Memory baseline memory   Insight admits / accepts   Judgement intact   Eye Contact at examiner   Affect full range affect   Mood mood is calm   Physical Appearance/Attire attire appropriate to age and situation   Hygiene well groomed   Suicidality other (see comments)  (denies )   1. Wish to be Dead No   2. Non-Specific Active Suicidal Thoughts  No   Self Injury other (see comment)  (denies )   Activity withdrawn   Speech clear;coherent   Medication Sensitivity no stated side effects;no observed side effects   Psychomotor / Gait balanced;steady   Psycho Education   Type of Intervention 1:1 intervention   Response participates, initiates socially appropriate   Hours 0.5   Treatment Detail check in    Group Therapy Session   Group Attendance refused to attend group session   Activities of Daily Living   Hygiene/Grooming independent   Oral Hygiene independent   Dress independent   Room Organization independent   Activity   Activity Assistance Provided independent

## 2019-01-26 NOTE — PROGRESS NOTES
Pt denies SI/SIB. Pt social with peers- played cards attended community meeting. Pt stated he has poor concentration through out the day. Pt had a good visit with his wife. Pt stated he rested well and is not as foggy as previous days.     01/26/19 1522   Behavioral Health   Hallucinations denies / not responding to hallucinations   Thinking intact   Orientation person: oriented;place: oriented;date: oriented   Memory baseline memory   Insight admits / accepts   Judgement intact   Affect full range affect   Mood mood is calm   Suicidality other (see comments)  (pt denies)   Self Injury (pt denies)   Activity other (see comment)  (social - cards / community meeting)   Activities of Daily Living   Hygiene/Grooming independent   Oral Hygiene independent   Dress independent   Room Organization independent   Behavioral Health Interventions   Social and Therapeutic Interventions (Psychotic Symptoms) encourage socialization with peers;encourage effective boundaries with peers;encourage participation in therapeutic groups and milieu activities

## 2019-01-26 NOTE — PLAN OF CARE
"Jorge said he slept well last night  \"a long time.\".  He is relaxed this am, initiating am meds, knows the, purpose, examines them, takes responsibility.  States Ativan \"helps me talk more.\".   "

## 2019-01-27 PROCEDURE — 25000132 ZZH RX MED GY IP 250 OP 250 PS 637: Performed by: STUDENT IN AN ORGANIZED HEALTH CARE EDUCATION/TRAINING PROGRAM

## 2019-01-27 PROCEDURE — 12400001 ZZH R&B MH UMMC

## 2019-01-27 PROCEDURE — 93005 ELECTROCARDIOGRAM TRACING: CPT

## 2019-01-27 PROCEDURE — 93010 ELECTROCARDIOGRAM REPORT: CPT | Performed by: INTERNAL MEDICINE

## 2019-01-27 RX ADMIN — TRAZODONE HYDROCHLORIDE 50 MG: 50 TABLET ORAL at 21:42

## 2019-01-27 RX ADMIN — LORAZEPAM 1 MG: 1 TABLET ORAL at 08:52

## 2019-01-27 RX ADMIN — THERA TABS 1 TABLET: TAB at 08:52

## 2019-01-27 RX ADMIN — SERTRALINE HYDROCHLORIDE 200 MG: 100 TABLET ORAL at 08:52

## 2019-01-27 RX ADMIN — TRAZODONE HYDROCHLORIDE 50 MG: 50 TABLET ORAL at 22:52

## 2019-01-27 RX ADMIN — LORAZEPAM 1 MG: 1 TABLET ORAL at 13:51

## 2019-01-27 RX ADMIN — LORAZEPAM 1 MG: 1 TABLET ORAL at 19:00

## 2019-01-27 RX ADMIN — ATOMOXETINE HYDROCHLORIDE 10 MG: 10 CAPSULE ORAL at 08:51

## 2019-01-27 ASSESSMENT — ACTIVITIES OF DAILY LIVING (ADL)
LAUNDRY: WITH SUPERVISION
HYGIENE/GROOMING: INDEPENDENT
ORAL_HYGIENE: INDEPENDENT
DRESS: INDEPENDENT;STREET CLOTHES

## 2019-01-27 NOTE — PROVIDER NOTIFICATION
01/26/19 2200   Behavioral Health   Hallucinations denies / not responding to hallucinations   Thinking intact   Orientation person: oriented;place: oriented;date: oriented;time: oriented   Memory baseline memory   Insight admits / accepts   Judgement intact   Affect/Mood (WDL) ex   Affect full range affect   Mood mood is calm  (somewhat anxious)   ADL Assessment (WDL) WDL   Suicidality (WDL) WDL   Suicidality other (see comments)  (denies)   1. Wish to be Dead No     Jorge is calm & social this shift, playing games & watching movies with peers. He states he had a good visit with his wife today. He denies SI, SIB, HI, AH. He is appropriate and pleasant in interactions with staff. He was somewhat anxious while getting vital signs and worried about his blood pressure.

## 2019-01-27 NOTE — PROGRESS NOTES
Pt denies any SI/SIB. Pt was active in the milieu all day today, interacting with fellow patients appropriately. Pt reports feeling safe in the hospital, citing groups and fellow patient interaction as two of the most helpful aspects of time here. Pt does describe some stressors in life outside of the hospital, but reports an established support system and treatment team that he feels will be helpful in managing these stressors once he leaves. Pt did note some concern with feelings of fatigue when taking Ativan, but denies any other complaints or concerns at this time.        01/27/19 1443   Behavioral Health   Hallucinations denies / not responding to hallucinations   Thinking intact   Orientation person: oriented;place: oriented;date: oriented;time: oriented   Memory baseline memory   Insight insight appropriate to situation;insight appropriate to events   Judgement intact   Eye Contact at examiner   Affect full range affect   Mood mood is calm   Physical Appearance/Attire attire appropriate to age and situation   Hygiene well groomed   Suicidality (pt denies)   1. Wish to be Dead No   2. Non-Specific Active Suicidal Thoughts  No   Self Injury (pt denies)   Activity (active in milieu)   Speech clear;coherent   Medication Sensitivity (pt does note some fatigue as a result of the Ativan)

## 2019-01-28 VITALS
RESPIRATION RATE: 16 BRPM | OXYGEN SATURATION: 95 % | BODY MASS INDEX: 28.42 KG/M2 | TEMPERATURE: 97.6 F | HEIGHT: 71 IN | SYSTOLIC BLOOD PRESSURE: 136 MMHG | HEART RATE: 78 BPM | DIASTOLIC BLOOD PRESSURE: 91 MMHG | WEIGHT: 203 LBS

## 2019-01-28 LAB — INTERPRETATION ECG - MUSE: NORMAL

## 2019-01-28 PROCEDURE — 25000132 ZZH RX MED GY IP 250 OP 250 PS 637: Performed by: STUDENT IN AN ORGANIZED HEALTH CARE EDUCATION/TRAINING PROGRAM

## 2019-01-28 PROCEDURE — G0177 OPPS/PHP; TRAIN & EDUC SERV: HCPCS

## 2019-01-28 PROCEDURE — 99238 HOSP IP/OBS DSCHRG MGMT 30/<: CPT | Mod: GC | Performed by: PSYCHIATRY & NEUROLOGY

## 2019-01-28 RX ORDER — TRAZODONE HYDROCHLORIDE 50 MG/1
50 TABLET, FILM COATED ORAL
Qty: 30 TABLET | Refills: 0 | Status: SHIPPED | OUTPATIENT
Start: 2019-01-28 | End: 2023-02-19

## 2019-01-28 RX ORDER — MULTIVITAMIN,THERAPEUTIC
1 TABLET ORAL DAILY
Qty: 30 TABLET | Refills: 0 | Status: SHIPPED | OUTPATIENT
Start: 2019-01-29 | End: 2019-02-28

## 2019-01-28 RX ORDER — ATOMOXETINE 10 MG/1
10 CAPSULE ORAL DAILY
Qty: 30 CAPSULE | Refills: 0 | Status: SHIPPED | OUTPATIENT
Start: 2019-01-29 | End: 2019-02-28

## 2019-01-28 RX ORDER — LORAZEPAM 1 MG/1
1 TABLET ORAL 3 TIMES DAILY
Qty: 90 TABLET | Refills: 0 | Status: SHIPPED | OUTPATIENT
Start: 2019-01-28 | End: 2019-02-27

## 2019-01-28 RX ORDER — SERTRALINE HYDROCHLORIDE 100 MG/1
200 TABLET, FILM COATED ORAL EVERY MORNING
Qty: 60 TABLET | Refills: 0 | Status: SHIPPED | OUTPATIENT
Start: 2019-01-28 | End: 2019-05-06

## 2019-01-28 RX ADMIN — THERA TABS 1 TABLET: TAB at 08:54

## 2019-01-28 RX ADMIN — SERTRALINE HYDROCHLORIDE 200 MG: 100 TABLET ORAL at 08:54

## 2019-01-28 RX ADMIN — ATOMOXETINE HYDROCHLORIDE 10 MG: 10 CAPSULE ORAL at 08:54

## 2019-01-28 RX ADMIN — LORAZEPAM 1 MG: 1 TABLET ORAL at 17:35

## 2019-01-28 RX ADMIN — LORAZEPAM 1 MG: 1 TABLET ORAL at 13:04

## 2019-01-28 RX ADMIN — LORAZEPAM 1 MG: 1 TABLET ORAL at 08:54

## 2019-01-28 ASSESSMENT — ACTIVITIES OF DAILY LIVING (ADL)
LAUNDRY: WITH SUPERVISION
DRESS: STREET CLOTHES;INDEPENDENT
ORAL_HYGIENE: INDEPENDENT
HYGIENE/GROOMING: INDEPENDENT
HYGIENE/GROOMING: HANDWASHING;SHOWER;INDEPENDENT
ORAL_HYGIENE: INDEPENDENT
DRESS: INDEPENDENT
LAUNDRY: WITH SUPERVISION

## 2019-01-28 NOTE — PROVIDER NOTIFICATION
"   01/27/19 2100   Behavioral Health   Hallucinations denies / not responding to hallucinations   Thinking intact   Orientation person: oriented;place: oriented;date: oriented;time: oriented   Memory baseline memory   Insight insight appropriate to situation   Judgement intact   Eye Contact at examiner   Affect/Mood (WDL) WDL   Affect full range affect   Mood mood is calm   ADL Assessment (WDL) WDL   Suicidality (WDL) WDL   1. Wish to be Dead No   2. Non-Specific Active Suicidal Thoughts  No     Patient states his anxiety is \"way low\" and depression is \"better.\" He is a little apprehensive about discharge, but has appointments in place and feels ready. He says he has a good support system in his life. He was in the milieu and appropriate with peers and staff.  "

## 2019-01-28 NOTE — PLAN OF CARE
OT General Care Plan  OT Frequency    Pt actively participated in occupational therapy clinic. Pt was able to ask for assistance as needed, and independently returned to a creative expression task. Pt demonstrated excellent focus, planning, problem solving, and attention to detail. Organized in his task approach. Intermittently and appropriately social with peers and writer throughout. Pleasant and polite on approach.

## 2019-01-28 NOTE — DISCHARGE SUMMARY
"    ----------------------------------------------------------------------------------------------------------  [facility]  Discharge Summary      Jorge Diana MRN# [id number]   Age: 48 year old YOB: 1970     Date of Admission:  1/18/2019  Date of Discharge:  1/28/2019  Admitting Physician:  Padmini Sanchez MD  Discharge Physician:  Padmini Sanchez MD         Event Leading to Hospitalization:   The following is taken from H&P dated 01/18/19 by Dr. Gustavo Rodriguez:    \"The patient is a 48-year-old male with past medical history of rather uncomplicated major depressive disorder, and possibly anxiety disorder who presented on 01/18/2019 with worsening anxiety, the year, and motor symptoms suggestive of catatonia in the setting of daily marijuana use, which is not uncommon for the patient.      Review of records shows that the patient presented to the ED at [facility] with his wife.  Most of the history was obtained from the patient's wife.  She reports that the patient has been increasingly anxious of the past 2-3 days.  She reported how the patient has been having periods lately where he \"cannot speak or interact.\"  For the last 2 days, the patient has been \"just standing and doing nothing at home.\"  He has also had extreme anxiety where he is unable to complete any tasks.  He has also difficulty sleeping and decreased appetite.  Apparently, the patient was going to be tested for bipolar disorder.  She notes overall that the patient is having difficulty putting sentences together.      Also reports that she feels like the patient has had much milder episodes like this over the past 2 years, in which he would have some episodes of staring, or be a bit slow to respond to questions, and this may go on for a few hours, even a day.  In December, he experienced these symptoms for 3-4 days.  At that time his psych provider increased his Zoloft to 200 mg per day, which " "seemed to help.  The patient's most recent symptoms began about 3 days ago and have worsened to the point where he is unable to converse or function.      Upon interview of the patient, the patient was found to be in his room standing, filling out a menu.  The patient was rather stationary and was able to briefly converse, taking moments where he would pause and not respond.  The patient had increased latency in between his responses.  When questioned, patient often would not respond.  The patient was able to say that he was scared and anxious.  The patient was also oriented at this time.  The patient was able to follow simple or motor commands.  Engaged in testing for catatonia with the patient having elements suggestive of catatonia.  The patient unable to vocalize any immediate concerns at this point.  Informed the patient that medications will be used at this time.  The patient consents to these medications, namely Ativan.      The risks, benefits, alternatives and side effects discussed and understood by the patient and other caregivers.\"                   Diagnoses:     Principal Diagnosis:   #Major depressive disorder, with catatonia     Secondary psychiatric diagnoses of concern this admission:  #Anxiety  #Attention deficit hyperactivity disorder  #Possible neurocognitive deficit/neuropsychiatric disorder  -MoCA score: 26    Diagnostic Impression: Jorge Diana, a 48 year old male, with history of major depressive disorder, anxiety, and daily cannabis use, presented to [facility] ED due to increased depression with likely catatonia in the context of increased stress at work. At the time of admission the patient's depressive symptoms had not resolved with Abilify, and his sleep was worse, so Abilify was discontinued at the time of admission. The patient has a long history of self-described poor attention and retention of spoken and written information. This may represent a symptom of his depression " or other cognitive pathology such as ADHD, neurocognitive disorder, learning disability and may merit further testing. During this hospitalization the inpatient neuropsychologist team was consulted and due to patient's negative brain MRI and normal MOCA, inpatient neurocognitive testing was not pursued.  Outpatient testing is recommended if the patient has continued concerns and outpatient providers deem it appropriate.            Consults:     None during this admission         Hospital Course:      Jorge Diana was admitted to station 22 as a voluntary patient. His catatonic symptoms improved over the course of the weekend with Ativan. Strattera 10mg was started to target Jorge's concentration difficulties and for mood stabilization affects.  Ativan was discontinued on 01/22/19 due to resolution of catatonia at that time and Jorge was started on Abilify.  However, Jorge was unable to sleep with the addition of the Abilify and there were no noted improvements in his mood, so the Abilify was discontinued.  Of note, Jorge's confusion and lack of ability to answer questions, concentrate, and convey his feelings seemed to worsen by 01/24/19.  There were concerns for either worsening catatonia or underlying cognitive deficits.  A MOCA was conducted on 01/24/19, which was within normal limits, and Ativan was restarted at 1mg TID. MRI of the brain was obtained on 1/24/19, and this was normal. Jorge's symptoms significantly improved once Ativan was restarted at 1mg TID.  He reported his thinking was more clear, he was better able to track conversations, and his depressive symptoms had improved.     Outpatient provider recommendations:     -Jorge was discharged with a one month supply of Ativan 1mg TID.  It is recommended that his Ativan be tapered slowly beginning with his psychiatry appointment on 02/05/19.  If symptoms worsen during taper it would be reasonable to consider keeping Kennedy on Ativan for a longer period of  "time as it seems to be very effective in managing his symptoms.    - Jorge's blood pressure was running high during his hospitalization he may have essential hypertension, we recommend this be followed by his outpatient PCP          - During this hospitalization the inpatient neuropsychologist team was consulted and due to patient's negative brain MRI and normal MOCA, inpatient neurocognitive testing was not pursued.  Outpatient testing is recommended if the patient has continued concerns and outpatient providers deem it appropriate.           Jorge Diana was discharged to home. At the time of discharge Jorge Diana was determined to not be a danger to himself or others.    Today Jorge Diana denies SI/SIB/HI. In addition, Jorge Diana has notable risk factors for self-harm, including age, anxiety and substance abuse. However, risk is mitigated by commitment to family, ability to volunteer a safety plan and history of seeking help when needed.Additional steps taken to minimize risk include: medication optimization and inpatient stablization. Therefore, based on all available evidence including the factors cited above, Jorge Diana does not appear to be at imminent risk for self-harm, and is appropriate for outpatient level of care.     This document serves as a transfer of care to Jorge Diana's outpatient providers.         Discharge Medications:     Straterra 10mg daily   Sertraline 200 mg PO every day  Ativan 1 mg PO TID   Multivitamin 1 tablet PO every day   Trazodone 50mg PO at bedtime PRN for sleep              Psychiatric Examination:     Appearance:  awake, alert, adequately groomed and appeared as age stated  Attitude:  cooperative  Eye Contact:  good  Mood:  \"Feeling better\"  Affect:  appropriate and in normal range and mood congruent  Speech:  clear, coherent  Psychomotor Behavior:  no evidence of tardive dyskinesia, dystonia, or tics  Thought Process:  logical, linear and goal " oriented  Associations:  no loose associations  Thought Content:  no evidence of suicidal ideation or homicidal ideation  Insight:  fair  Judgment:  fair  Oriented to:  time, person, and place  Attention Span and Concentration:  fair  Recent and Remote Memory:  fair  Language: Able to name objects, Able to repeat phrases and Able to read and write  Fund of Knowledge: appropriate  Muscle Strength and Tone: normal  Gait and Station: Normal         Discharge Plan:     Health Care Follow-up Appointments:   [facility]: 19 at 10:40  Andreina Sharron   [address]  [phone] FAX:[phone]     [facility]:  19 11:20  Rhea Hui  [address], [place] [address]  Phone: [phone]    This patient was seen and discussed with my attending physician.  Dr. Pennie Everett DO, MBA, MS  PGY-1 Psychiatry Resident Physician       Attestation:   The patient has been seen and evaluated by me,  Padmini Sanchez. I have examined the patient today and reviewed the discharge plan with the resident and medical student. I agree with the final assessment and plan, as noted in the discharge summary. I have reviewed today's vital signs, medications, labs and imaging.  Total time discharge plannin minutes  Padmini Sanchez MD    --------------------------------------------------------------------------------------    Appendix A, Labs:   Results for orders placed or performed during the hospital encounter of 19   MRI Brain w/o contrast    Narrative    MR BRAIN W/O CONTRAST 2019 4:32 PM    Provided History: Neuro deficit(s), subacute  ICD-10:    Comparison:  2019 dated head CT     Technique: Sagittal T1-weighted and axial T2-weighted, turboFLAIR and  diffusion-weighted with ADC map images of the brain were obtained  without intravenous contrast.    Findings: These images reveal no intracranial mass lesion, mass  effect, midline  shift or abnormal extraaxial fluid collection. The  ventricles and sulci are normal for age. Diffusion-weighted images  demonstrate no abnormality of reduced diffusion.  Normal intravascular  flow voids are identified.      Impression    Impression: Normal brain MRI.    ARLETTE ELIZABETH MD   UA reflex to Microscopic and Culture   Result Value Ref Range    Color Urine Yellow     Appearance Urine Clear     Glucose Urine 30 (A) NEG^Negative mg/dL    Bilirubin Urine Negative NEG^Negative    Ketones Urine 5 (A) NEG^Negative mg/dL    Specific Gravity Urine 1.030 1.003 - 1.035    Blood Urine Negative NEG^Negative    pH Urine 6.0 5.0 - 7.0 pH    Protein Albumin Urine 10 (A) NEG^Negative mg/dL    Urobilinogen mg/dL Normal 0.0 - 2.0 mg/dL    Nitrite Urine Negative NEG^Negative    Leukocyte Esterase Urine Negative NEG^Negative    Source Midstream Urine     RBC Urine 1 0 - 2 /HPF    WBC Urine <1 0 - 5 /HPF    Bacteria Urine Few (A) NEG^Negative /HPF    Squamous Epithelial /HPF Urine <1 0 - 1 /HPF    Mucous Urine Present (A) NEG^Negative /LPF    Hyaline Casts 2 0 - 2 /LPF   TSH with free T4 reflex and/or T3 as indicated   Result Value Ref Range    TSH 0.82 0.40 - 4.00 mU/L   Lipid panel   Result Value Ref Range    Cholesterol 280 (H) <200 mg/dL    Triglycerides 141 <150 mg/dL    HDL Cholesterol 51 >39 mg/dL    LDL Cholesterol Calculated 201 (H) <100 mg/dL    Non HDL Cholesterol 229 (H) <130 mg/dL   Vitamin B12   Result Value Ref Range    Vitamin B12 360 193 - 986 pg/mL   Folate   Result Value Ref Range    Folate 19.2 >5.4 ng/mL   Magnesium   Result Value Ref Range    Magnesium 2.2 1.6 - 2.3 mg/dL   Phosphorus   Result Value Ref Range    Phosphorus 2.9 2.5 - 4.5 mg/dL   Vitamin D   Result Value Ref Range    Vitamin D Deficiency screening 26 20 - 75 ug/L   CK total   Result Value Ref Range    CK Total 137 30 - 300 U/L

## 2019-01-28 NOTE — PLAN OF CARE
Jorge planning discharge 1730 care of wife-actively participating in grps and activities throughout the day-reviewed medication schedule and outpt tx plans and verbalized understanding-will be sent with 30 day supply of medications, except Zoloft which he states he has at home

## 2019-01-28 NOTE — DISCHARGE INSTRUCTIONS
Behavioral Discharge Planning and Instructions      Summary:  You were admitted on 1/18/2019 due to Depression.  You were treated by Dr. Padmini Sanchez MD and discharged on 02/28/2019 from [facility] to home to follow up with your outpatient providers.    Principal Diagnosis:   Major depressive disorder, with catatonia    Health Care Follow-up Appointments:   [facility]: Tuesday 2/5/19 at 10:40  Andreina Mcdermott   [address]  [phone] FAX:[phone]    [facility]:  Friday 2/1/19 11:20  Rhea Hui  [address], [place] [address]  Phone: [phone]    Attend all scheduled appointments with your outpatient providers. Call at least 24 hours in advance if you need to reschedule an appointment to ensure continued access to your outpatient providers.   Major Treatments, Procedures and Findings:  You were provided with: a psychiatric assessment, assessed for medical stability, medication evaluation and/or management, group therapy and milieu management    Symptoms to Report: Feeling more aggressive, increased confusion, losing more sleep, mood getting worse or thoughts of suicide    Early warning signs can include: Increased depression or anxiety sleep disturbances increased thoughts or behaviors of suicide or self-harm  increased unusual thinking, such as paranoia or hearing voices    Safety and Wellness:  Take all medicines as directed.  Make no changes unless your doctor suggests them. Follow treatment recommendations.  Refrain from alcohol and non-prescribed drugs.  Ask your support system to help you reduce your access to items that could harm yourself or others. If there is a concern for safety, call 911.    Resources:   Crisis Intervention: 718.591.9112 or 072-034-6554 (TTY: 523.967.8463).  Call anytime for help.  National [place] on Mental Illness (www.[place].xochitl.org): 295.996.4949 or 202-678-4846.  Suicide Awareness Voices of  Education (SAVE) (www.save.org): 837-528-WCJK (7283)  National Suicide Prevention Line (www.mentalhealthmn.org): 316-833-UWJN (0858)  Mental Health Consumer/Survivor Network of [place] (www.mhcsn.net): 259.193.4933 or 250-735-7720  Mental Health Association of [place] (www.mentalhealth.org): 582.940.8434 or 028-710-5341    The treatment team has appreciated the opportunity to work with you.     If you have any questions or concerns our unit number is [phone].  You may be receiving a follow-up phone call within the next three days from a representative from behavioral health.

## 2019-01-29 NOTE — PLAN OF CARE
Client discharged to home with his wife, ambulatory. He has all belongings with him (had no valuables listed). He said his good byes to peers and denies any MH symptoms. Has discharge medications.

## 2019-05-06 ENCOUNTER — HOSPITAL ENCOUNTER (EMERGENCY)
Facility: CLINIC | Age: 49
Discharge: HOME | End: 2019-05-06
Attending: EMERGENCY MEDICINE | Admitting: EMERGENCY MEDICINE
Payer: OTHER MISCELLANEOUS

## 2019-05-06 ENCOUNTER — APPOINTMENT (OUTPATIENT)
Dept: GENERAL RADIOLOGY | Facility: CLINIC | Age: 49
End: 2019-05-06
Attending: EMERGENCY MEDICINE
Payer: OTHER MISCELLANEOUS

## 2019-05-06 VITALS
HEIGHT: 70 IN | OXYGEN SATURATION: 99 % | RESPIRATION RATE: 16 BRPM | BODY MASS INDEX: 27.92 KG/M2 | HEART RATE: 64 BPM | WEIGHT: 195 LBS | SYSTOLIC BLOOD PRESSURE: 121 MMHG | TEMPERATURE: 98.4 F | DIASTOLIC BLOOD PRESSURE: 84 MMHG

## 2019-05-06 DIAGNOSIS — S81.811A LACERATION OF LOWER LEG, RIGHT, INITIAL ENCOUNTER: ICD-10-CM

## 2019-05-06 PROCEDURE — 12002 RPR S/N/AX/GEN/TRNK2.6-7.5CM: CPT | Mod: RT

## 2019-05-06 PROCEDURE — 99283 EMERGENCY DEPT VISIT LOW MDM: CPT

## 2019-05-06 PROCEDURE — 73590 X-RAY EXAM OF LOWER LEG: CPT | Mod: RT

## 2019-05-06 PROCEDURE — 25000132 ZZH RX MED GY IP 250 OP 250 PS 637: Performed by: EMERGENCY MEDICINE

## 2019-05-06 RX ORDER — ACETAMINOPHEN 325 MG/1
975 TABLET ORAL ONCE
Status: COMPLETED | OUTPATIENT
Start: 2019-05-06 | End: 2019-05-06

## 2019-05-06 RX ORDER — CEPHALEXIN 500 MG/1
500 CAPSULE ORAL 4 TIMES DAILY
Qty: 28 CAPSULE | Refills: 0 | Status: SHIPPED | OUTPATIENT
Start: 2019-05-06 | End: 2019-05-13

## 2019-05-06 RX ORDER — ATOMOXETINE 10 MG/1
10 CAPSULE ORAL DAILY
COMMUNITY
End: 2023-02-19

## 2019-05-06 RX ORDER — OXYCODONE HYDROCHLORIDE 5 MG/1
5 TABLET ORAL ONCE
Status: COMPLETED | OUTPATIENT
Start: 2019-05-06 | End: 2019-05-06

## 2019-05-06 RX ADMIN — ACETAMINOPHEN 975 MG: 325 TABLET, FILM COATED ORAL at 10:37

## 2019-05-06 RX ADMIN — OXYCODONE HYDROCHLORIDE 5 MG: 5 TABLET ORAL at 10:37

## 2019-05-06 ASSESSMENT — MIFFLIN-ST. JEOR: SCORE: 1765.76

## 2019-05-06 ASSESSMENT — ENCOUNTER SYMPTOMS: WOUND: 1

## 2019-05-06 NOTE — LETTER
May 6, 2019      To Whom It May Concern:      Jorge Diana was seen in our Emergency Department today, 05/06/19.  I expect his condition to improve over the next 2-3 days.  He may return to work/school when improved.    Sincerely,        Dr Markie BOYLE/Bret R. Dreyer, RN

## 2019-05-06 NOTE — ED AVS SNAPSHOT
Emergency Department  [address] [place] [address]  Phone:  [phone]  Fax:  [phone]                                    Jorge Diana   MRN: [id number]    Department:   Emergency Department   Date of Visit:  5/6/2019           After Visit Summary Signature Page    I have received my discharge instructions, and my questions have been answered. I have discussed any challenges I see with this plan with the nurse or doctor.    ..........................................................................................................................................  Patient/Patient Representative Signature      ..........................................................................................................................................  Patient Representative Print Name and Relationship to Patient    ..................................................               ................................................  Date                                   Time    ..........................................................................................................................................  Reviewed by Signature/Title    ...................................................              ..............................................  Date                                               Time          22EPIC Rev 08/18

## 2019-05-06 NOTE — ED TRIAGE NOTES
3 large slabs of granite fell on right lower leg at work: open to bone, per manager.  Bleeding through bandage

## 2019-05-06 NOTE — ED PROVIDER NOTES
"  History     Chief Complaint:  Trauma    HPI   Jorge Diana is a 48 year old male who presents with a laceration to the right lower extremity after he was scraped by approximately 7500 lbs of granite as it came down. The patient reports that the weight did not crush his foot but he did have some exposed bone as a result of this incident. He was bandaged but continued to bleed through the bandaging so he comes in for evaluation. Tetanus was in 2013    Allergies:  Penicillins    Medications:    Zoloft  Trazodone     Past Medical History:    Fatigue   hernia   Depression    Past Surgical History:    Herniorrhaphy umbilical  Laparoscopic herniorrhaphy inguinal bilateral  [place] teeth extraction    Family History:    No family history on file.    Social History:  The patient  reports that he quit smoking about 10 years ago. His smoking use included cigarettes. He has a 2.50 pack-year smoking history. He does not have any smokeless tobacco history on file. He reports that he drinks alcohol. He reports that he does not use drugs.   Marital Status:   [2]      Review of Systems   Skin: Positive for wound.   All other systems reviewed and are negative.      Physical Exam     Vital signs  Patient Vitals for the past 24 hrs:   BP Temp Temp src Pulse Resp SpO2 Height Weight   05/06/19 1220 -- -- -- -- 16 -- -- --   05/06/19 1213 -- -- -- -- -- 99 % -- --   05/06/19 1212 121/84 -- -- 64 -- 98 % -- --   05/06/19 1159 -- -- -- -- -- 99 % -- --   05/06/19 1158 -- -- -- -- -- 99 % -- --   05/06/19 1144 -- -- -- -- -- 100 % -- --   05/06/19 1143 -- -- -- -- -- 99 % -- --   05/06/19 1100 -- -- -- 69 -- 98 % -- --   05/06/19 1059 -- -- -- -- -- 98 % -- --   05/06/19 1042 -- -- -- -- -- 100 % -- --   05/06/19 1040 -- -- -- -- -- 96 % -- --   05/06/19 1039 (!) 118/96 -- -- 69 -- -- -- --   05/06/19 1000 132/76 -- -- 69 16 94 % -- --   05/06/19 0958 (!) 127/99 98.4  F (36.9  C) Oral 99 18 96 % 1.778 m (5' 10\") 88.5 kg (195 lb)    "     Physical Exam  Constitutional: Alert, attentive, GCS 15  HENT:    Nose: Nose normal.    Mouth/Throat: Oropharynx is clear, mucous membranes are moist  Eyes: EOM are normal, anicteric, conjugate gaze  CV: regular rate and rhythm; no murmurs  Chest: Effort normal and breath sounds clear without wheezing or rales, symmetric bilaterally   GI:  non tender. No distension. No guarding or rebound.    MSK: No LE edema, no tenderness to palpation of BLE. Stellate right lower anterior leg laceration over the tibia that penetrates down to periosteum but no evidence of exposed bone. No neurovascular injury. PATIENT and DP pulses intact. Plantar and dorsal reflexes normal.   Neurological: Alert, attentive, moving all extremities equally.   Skin: Skin is warm and dry.  Emergency Department Course   Imaging:  XR Tibia & Fibula Right 2 Views   Preliminary Result   IMPRESSION: Soft tissue irregularity along the anterior aspect of the   right tibia is consistent with history of laceration. No evidence for   associated fracture or dislocation in the right tibia.        Results as read by radiology.   I communicated the results of the imaging studies with the patient who expressed understanding of these findings.      Procedures:    Narrative: Procedure: Laceration Repair        LACERATION:  A complex clean 7 cm stellate laceration.      LOCATION:  Right shin      FUNCTION:  Distally sensation, circulation, motor and tendon function are intact.      ANESTHESIA:  Digital block using 0.5 % Bupivacaine with Epi total of 10 mLs      PREPARATION:  Irrigation and Scrubbing with Shur Clens      DEBRIDEMENT:  no debridement and wound explored, no foreign body found      CLOSURE:  Wound was closed with Two Layers: Subcutaneous layer closed with 5-O Prolene Horizontal mattress sutures. Skin closed with 7 x 3.0 Prolene using horizontal mattress sutures    Interventions:  1037: Tylenol 975 mg PO  1037: Roxicodone 5 mg PO    Emergency Department  Course:  Past medical records, nursing notes, and vitals reviewed.  0954: I performed an exam of the patient and obtained history, as documented above.      The patient was sent for imaging studies (XR Tibia) while in the emergency department, findings above.     I performed a repair of the shin laceration as detailed above.     1220: Rechecked the patient, findings and plan explained to the patient. Patient discharged home, status improved, with instructions regarding supportive care, medications, and reasons to return as well as the importance of close follow-up was reviewed.    Impression & Plan    Medical Decision Makin-year-old male past mental history significant for depression presenting for evaluation of right lower leg injury sustained in which several 102,000 pounds of Granite slid off a cart hitting his right lower leg.  No report of crush injury and appears to be a glancing injury causing a deep laceration.  His neurovascular exam distally is intact he has no evidence of crush injury.  X-rays negative for foreign body or fracture.  After wound was anesthetized was copiously irrigated for infection prophylaxis.  On direct inspection and it does penetrate down to the periosteum of the tibia though there is no clear violation of this.  Wound was repaired as above with several tension sutures by horizontal mattress and oversewn with 3-0 nylon.  Wound care was reviewed including high risk for infection as such I did also initiate him on Keflex for prophylaxis.  I recommended to follow-up with his PCP for suture removal in 12 to 14 days.  Return precautions reviewed including signs and symptoms of infection or severe pain.  I recommended Tylenol, ibuprofen ice and elevation.  Patient expressed understanding this plan.      Diagnosis:    ICD-10-CM    1. Laceration of lower leg, right, initial encounter S81.811A        Disposition:  discharged to home    Discharge Medications:     Medication List       Started    cephALEXin 500 MG capsule  Commonly known as:  KEFLEX  500 mg, Oral, 4 TIMES DAILY          Jordan Aden MD   [employer]  3:30 PM 05/06/19     Stef STARKS, dean serving as a scribe at 11:17 AM on 5/6/2019 to document services personally performed by Jordan Aden MD based on my observations and the provider's statements to me.     EMERGENCY DEPARTMENT       Jordan Aden MD  05/06/19 4113

## 2019-05-06 NOTE — LETTER
May 6, 2019      To Whom It May Concern:      Jorge Diana was seen in our Emergency Department today, 05/06/19.  I expect his condition to improve over the next 2-3 days.  He may return to work when improved.    Sincerely,        Dr. Markie BOYLE/Aram MAR RN

## 2020-03-06 ENCOUNTER — HOSPITAL ENCOUNTER (OUTPATIENT)
Facility: CLINIC | Age: 50
Discharge: HOME | End: 2020-03-06
Attending: COLON & RECTAL SURGERY | Admitting: COLON & RECTAL SURGERY
Payer: COMMERCIAL

## 2020-03-06 VITALS
SYSTOLIC BLOOD PRESSURE: 139 MMHG | RESPIRATION RATE: 12 BRPM | WEIGHT: 200 LBS | OXYGEN SATURATION: 95 % | HEIGHT: 69 IN | HEART RATE: 71 BPM | DIASTOLIC BLOOD PRESSURE: 105 MMHG | BODY MASS INDEX: 29.62 KG/M2

## 2020-03-06 LAB — COLONOSCOPY: NORMAL

## 2020-03-06 PROCEDURE — 88305 TISSUE EXAM BY PATHOLOGIST: CPT | Performed by: COLON & RECTAL SURGERY

## 2020-03-06 PROCEDURE — 88305 TISSUE EXAM BY PATHOLOGIST: CPT | Mod: 26 | Performed by: COLON & RECTAL SURGERY

## 2020-03-06 PROCEDURE — 25000128 H RX IP 250 OP 636: Performed by: COLON & RECTAL SURGERY

## 2020-03-06 PROCEDURE — 45380 COLONOSCOPY AND BIOPSY: CPT | Mod: PT | Performed by: COLON & RECTAL SURGERY

## 2020-03-06 PROCEDURE — G0500 MOD SEDAT ENDO SERVICE >5YRS: HCPCS | Performed by: COLON & RECTAL SURGERY

## 2020-03-06 RX ORDER — ONDANSETRON 2 MG/ML
4 INJECTION INTRAMUSCULAR; INTRAVENOUS
Status: DISCONTINUED | OUTPATIENT
Start: 2020-03-06 | End: 2020-03-06 | Stop reason: HOSPADM

## 2020-03-06 RX ORDER — LIDOCAINE 40 MG/G
CREAM TOPICAL
Status: DISCONTINUED | OUTPATIENT
Start: 2020-03-06 | End: 2020-03-06 | Stop reason: HOSPADM

## 2020-03-06 RX ORDER — SERTRALINE HYDROCHLORIDE 100 MG/1
200 TABLET, FILM COATED ORAL DAILY
COMMUNITY
End: 2023-02-24

## 2020-03-06 RX ORDER — FENTANYL CITRATE 50 UG/ML
INJECTION, SOLUTION INTRAMUSCULAR; INTRAVENOUS PRN
Status: DISCONTINUED | OUTPATIENT
Start: 2020-03-06 | End: 2020-03-06 | Stop reason: HOSPADM

## 2020-03-06 RX ORDER — LURASIDONE HYDROCHLORIDE 40 MG/1
TABLET, FILM COATED ORAL
COMMUNITY
End: 2023-02-19

## 2020-03-06 ASSESSMENT — MIFFLIN-ST. JEOR: SCORE: 1767.57

## 2020-03-06 NOTE — H&P
See Provation Note In Chart    Leyla Mock MD  [facility]  Office Phone # [phone]

## 2020-03-06 NOTE — H&P
Pre-Endoscopy History and Physical     Jorge Diana MRN# [id number]   YOB: 1970 Age: 49 year old     Date of Procedure: 03/06/20  Primary care provider: Rhea Alexander  Type of Endoscopy: Colonoscopy  Reason for Procedure: screening   Type of Anesthesia Anticipated: Moderate Sedation    HPI:    Jorge is a 49 year old male who will be undergoing the above procedure.      A history and physical has been performed. The patient's medications and allergies have been reviewed. The risks and benefits of the procedure and the sedation options and risks were discussed with the patient.  All questions were answered and informed consent was obtained.      He denies a personal or family history of anesthesia complications or bleeding disorders.     Allergies   Allergen Reactions     Penicillins Itching and Rash        No current facility-administered medications on file prior to encounter.   atomoxetine (STRATTERA) 10 MG capsule, Take 10 mg by mouth daily  lurasidone (LATUDA) 40 MG TABS tablet, Take by mouth daily with food  sertraline (ZOLOFT) 100 MG tablet, Take 200 mg by mouth daily  sertraline (ZOLOFT) 100 MG tablet, Take 2 tablets (200 mg) by mouth every morning  traZODone (DESYREL) 50 MG tablet, Take 1 tablet (50 mg) by mouth nightly as needed for sleep        Patient Active Problem List   Diagnosis     Depression        Past Medical History:   Diagnosis Date     Depressive disorder      Fatigue      Inguinal hernia without mention of obstruction or gangrene, unilateral or unspecified, (not specified as recurrent)         Past Surgical History:   Procedure Laterality Date     HERNIORRHAPHY UMBILICAL  3/2/2012    Procedure:HERNIORRHAPHY UMBILICAL; Surgeon:LILLIAN BROOKE; Location:[facility]     LAPAROSCOPIC HERNIORRHAPHY INGUINAL BILATERAL  3/2/2012    Procedure:LAPAROSCOPIC HERNIORRHAPHY INGUINAL BILATERAL; LAPAROSCOPIC BILATERAL INGUINAL HERNIA REPAIR WITH MESH, OPEN UMBILICAL HERNIA REPAIR WITH  "MESH ; Surgeon:LILLIAN BROOKE; Location:[facility]     MOUTH SURGERY      WISDOM TEETH EXTRACTION       Social History     Tobacco Use     Smoking status: Former Smoker     Packs/day: 0.50     Years: 5.00     Pack years: 2.50     Types: Cigarettes     Last attempt to quit: 2009     Years since quittin.1     Smokeless tobacco: Never Used   Substance Use Topics     Alcohol use: Yes     Comment: 8 - 10 BEERS PER WEEK       History reviewed. No pertinent family history.    REVIEW OF SYSTEMS:     5 point ROS negative except as noted above in HPI, including Gen., Resp., CV, GI &  system review.      PHYSICAL EXAM:   BP (!) 136/108   Pulse 85   Resp 14   Ht 1.753 m (5' 9\")   Wt 90.7 kg (200 lb)   SpO2 94%   BMI 29.53 kg/m   Estimated body mass index is 29.53 kg/m  as calculated from the following:    Height as of this encounter: 1.753 m (5' 9\").    Weight as of this encounter: 90.7 kg (200 lb).   GENERAL APPEARANCE: healthy and alert  MENTAL STATUS: alert  AIRWAY EXAM: Mallampatti Class II (visualization of the soft palate, fauces, and uvula)  RESP: lungs clear to auscultation - no rales, rhonchi or wheezes  CV: regular rates and rhythm      IMPRESSION   ASA Class 2 - Mild systemic disease        PLAN:     Plan for colonoscopy. We discussed the risks, benefits and alternatives and the patient wished to proceed.    The above has been forwarded to the consulting provider.      Leyla Mock MD  [facility]  Phone: [phone]  Fax: [phone]  2020    "

## 2020-03-09 LAB — COPATH REPORT: NORMAL

## 2020-03-22 ENCOUNTER — HEALTH MAINTENANCE LETTER (OUTPATIENT)
Age: 50
End: 2020-03-22

## 2020-05-01 ENCOUNTER — HOSPITAL ENCOUNTER (OUTPATIENT)
Facility: CLINIC | Age: 50
End: 2020-05-01
Attending: COLON & RECTAL SURGERY | Admitting: COLON & RECTAL SURGERY
Payer: COMMERCIAL

## 2020-05-01 RX ORDER — CIPROFLOXACIN 2 MG/ML
400 INJECTION, SOLUTION INTRAVENOUS
Status: CANCELLED | OUTPATIENT
Start: 2020-05-01

## 2020-05-01 RX ORDER — CIPROFLOXACIN 2 MG/ML
400 INJECTION, SOLUTION INTRAVENOUS SEE ADMIN INSTRUCTIONS
Status: CANCELLED | OUTPATIENT
Start: 2020-05-01

## 2020-05-01 RX ORDER — ACETAMINOPHEN 325 MG/1
975 TABLET ORAL ONCE
Status: CANCELLED | OUTPATIENT
Start: 2020-05-01 | End: 2020-05-01

## 2020-05-03 DIAGNOSIS — Z20.822 ENCOUNTER FOR LABORATORY TESTING FOR COVID-19 VIRUS: Primary | ICD-10-CM

## 2020-05-05 DIAGNOSIS — Z11.59 ENCOUNTER FOR SCREENING FOR OTHER VIRAL DISEASES: Primary | ICD-10-CM

## 2020-05-17 ENCOUNTER — OFFICE VISIT (OUTPATIENT)
Dept: URGENT CARE | Facility: URGENT CARE | Age: 50
End: 2020-05-17
Attending: COLON & RECTAL SURGERY
Payer: COMMERCIAL

## 2020-05-17 DIAGNOSIS — Z20.822 ENCOUNTER FOR LABORATORY TESTING FOR COVID-19 VIRUS: ICD-10-CM

## 2020-05-17 PROCEDURE — 99207 ZZC NO BILLABLE SERVICE THIS VISIT: CPT

## 2020-05-17 PROCEDURE — U0003 INFECTIOUS AGENT DETECTION BY NUCLEIC ACID (DNA OR RNA); SEVERE ACUTE RESPIRATORY SYNDROME CORONAVIRUS 2 (SARS-COV-2) (CORONAVIRUS DISEASE [COVID-19]), AMPLIFIED PROBE TECHNIQUE, MAKING USE OF HIGH THROUGHPUT TECHNOLOGIES AS DESCRIBED BY CMS-2020-01-R: HCPCS | Mod: 90 | Performed by: COLON & RECTAL SURGERY

## 2020-05-17 PROCEDURE — 99000 SPECIMEN HANDLING OFFICE-LAB: CPT | Performed by: COLON & RECTAL SURGERY

## 2020-05-18 LAB
SARS-COV-2 RNA SPEC QL NAA+PROBE: NOT DETECTED
SPECIMEN SOURCE: NORMAL

## 2021-01-15 ENCOUNTER — HEALTH MAINTENANCE LETTER (OUTPATIENT)
Age: 51
End: 2021-01-15

## 2021-05-15 ENCOUNTER — HEALTH MAINTENANCE LETTER (OUTPATIENT)
Age: 51
End: 2021-05-15

## 2021-10-24 ENCOUNTER — HEALTH MAINTENANCE LETTER (OUTPATIENT)
Age: 51
End: 2021-10-24

## 2022-06-05 ENCOUNTER — HEALTH MAINTENANCE LETTER (OUTPATIENT)
Age: 52
End: 2022-06-05

## 2022-10-16 ENCOUNTER — HEALTH MAINTENANCE LETTER (OUTPATIENT)
Age: 52
End: 2022-10-16

## 2023-02-19 ENCOUNTER — HOSPITAL ENCOUNTER (OUTPATIENT)
Facility: CLINIC | Age: 53
Setting detail: OBSERVATION
Discharge: HOME | End: 2023-02-24
Attending: EMERGENCY MEDICINE | Admitting: EMERGENCY MEDICINE
Payer: COMMERCIAL

## 2023-02-19 DIAGNOSIS — F06.1 CATATONIA: ICD-10-CM

## 2023-02-19 DIAGNOSIS — F51.05 INSOMNIA DUE TO OTHER MENTAL DISORDER: ICD-10-CM

## 2023-02-19 DIAGNOSIS — F99 INSOMNIA DUE TO OTHER MENTAL DISORDER: ICD-10-CM

## 2023-02-19 DIAGNOSIS — F90.9 ATTENTION DEFICIT HYPERACTIVITY DISORDER (ADHD), UNSPECIFIED ADHD TYPE: ICD-10-CM

## 2023-02-19 DIAGNOSIS — F33.3 SEVERE EPISODE OF RECURRENT MAJOR DEPRESSIVE DISORDER, WITH PSYCHOTIC FEATURES (H): ICD-10-CM

## 2023-02-19 DIAGNOSIS — F41.1 GENERALIZED ANXIETY DISORDER: ICD-10-CM

## 2023-02-19 LAB — SARS-COV-2 RNA RESP QL NAA+PROBE: NEGATIVE

## 2023-02-19 PROCEDURE — 90791 PSYCH DIAGNOSTIC EVALUATION: CPT

## 2023-02-19 PROCEDURE — 250N000013 HC RX MED GY IP 250 OP 250 PS 637: Performed by: NURSE PRACTITIONER

## 2023-02-19 PROCEDURE — G0378 HOSPITAL OBSERVATION PER HR: HCPCS

## 2023-02-19 PROCEDURE — 99285 EMERGENCY DEPT VISIT HI MDM: CPT | Mod: 25

## 2023-02-19 PROCEDURE — U0005 INFEC AGEN DETEC AMPLI PROBE: HCPCS | Performed by: EMERGENCY MEDICINE

## 2023-02-19 PROCEDURE — C9803 HOPD COVID-19 SPEC COLLECT: HCPCS

## 2023-02-19 RX ORDER — BUSPIRONE HYDROCHLORIDE 30 MG/1
1 TABLET ORAL
COMMUNITY
Start: 2022-12-24 | End: 2023-02-24

## 2023-02-19 RX ORDER — HYDROXYZINE HYDROCHLORIDE 50 MG/1
50 TABLET, FILM COATED ORAL EVERY 6 HOURS PRN
Status: DISCONTINUED | OUTPATIENT
Start: 2023-02-19 | End: 2023-02-24 | Stop reason: HOSPADM

## 2023-02-19 RX ORDER — LURASIDONE HYDROCHLORIDE 60 MG/1
TABLET, FILM COATED ORAL
COMMUNITY
Start: 2022-11-23 | End: 2023-02-24

## 2023-02-19 RX ORDER — BUSPIRONE HYDROCHLORIDE 10 MG/1
30 TABLET ORAL 2 TIMES DAILY
Status: DISCONTINUED | OUTPATIENT
Start: 2023-02-19 | End: 2023-02-19

## 2023-02-19 RX ORDER — OLANZAPINE 10 MG/2ML
10 INJECTION, POWDER, FOR SOLUTION INTRAMUSCULAR 2 TIMES DAILY PRN
Status: DISCONTINUED | OUTPATIENT
Start: 2023-02-19 | End: 2023-02-24 | Stop reason: HOSPADM

## 2023-02-19 RX ORDER — LAMOTRIGINE 100 MG/1
200 TABLET ORAL DAILY
Status: DISCONTINUED | OUTPATIENT
Start: 2023-02-19 | End: 2023-02-19

## 2023-02-19 RX ORDER — LAMOTRIGINE 100 MG/1
2 TABLET ORAL
COMMUNITY
Start: 2022-12-21

## 2023-02-19 RX ORDER — ATOMOXETINE 80 MG/1
80 CAPSULE ORAL DAILY
Status: DISCONTINUED | OUTPATIENT
Start: 2023-02-20 | End: 2023-02-24 | Stop reason: HOSPADM

## 2023-02-19 RX ORDER — TRAZODONE HYDROCHLORIDE 150 MG/1
150 TABLET ORAL AT BEDTIME
Status: DISCONTINUED | OUTPATIENT
Start: 2023-02-19 | End: 2023-02-22

## 2023-02-19 RX ORDER — ATOMOXETINE 80 MG/1
80 CAPSULE ORAL DAILY
COMMUNITY
Start: 2022-11-02

## 2023-02-19 RX ORDER — CLOTRIMAZOLE 1 %
CREAM (GRAM) TOPICAL 2 TIMES DAILY PRN
Status: DISCONTINUED | OUTPATIENT
Start: 2023-02-19 | End: 2023-02-24 | Stop reason: HOSPADM

## 2023-02-19 RX ORDER — SERTRALINE HYDROCHLORIDE 100 MG/1
200 TABLET, FILM COATED ORAL DAILY
Status: DISCONTINUED | OUTPATIENT
Start: 2023-02-19 | End: 2023-02-21

## 2023-02-19 RX ORDER — BUSPIRONE HYDROCHLORIDE 10 MG/1
30 TABLET ORAL
Status: DISCONTINUED | OUTPATIENT
Start: 2023-02-19 | End: 2023-02-20

## 2023-02-19 RX ORDER — LAMOTRIGINE 100 MG/1
200 TABLET ORAL DAILY
Status: DISCONTINUED | OUTPATIENT
Start: 2023-02-19 | End: 2023-02-21

## 2023-02-19 RX ORDER — IBUPROFEN 600 MG/1
600 TABLET, FILM COATED ORAL EVERY 6 HOURS PRN
Status: DISCONTINUED | OUTPATIENT
Start: 2023-02-19 | End: 2023-02-24 | Stop reason: HOSPADM

## 2023-02-19 RX ORDER — OLANZAPINE 10 MG/1
10 TABLET, ORALLY DISINTEGRATING ORAL 2 TIMES DAILY PRN
Status: DISCONTINUED | OUTPATIENT
Start: 2023-02-19 | End: 2023-02-24 | Stop reason: HOSPADM

## 2023-02-19 RX ORDER — TRAZODONE HYDROCHLORIDE 100 MG/1
100 TABLET ORAL AT BEDTIME
Status: DISCONTINUED | OUTPATIENT
Start: 2023-02-19 | End: 2023-02-23

## 2023-02-19 RX ORDER — LANOLIN ALCOHOL/MO/W.PET/CERES
3 CREAM (GRAM) TOPICAL
Status: DISCONTINUED | OUTPATIENT
Start: 2023-02-19 | End: 2023-02-24 | Stop reason: HOSPADM

## 2023-02-19 RX ORDER — ATOMOXETINE 80 MG/1
80 CAPSULE ORAL DAILY
Status: DISCONTINUED | OUTPATIENT
Start: 2023-02-19 | End: 2023-02-19

## 2023-02-19 RX ORDER — SERTRALINE HYDROCHLORIDE 100 MG/1
200 TABLET, FILM COATED ORAL DAILY
Status: DISCONTINUED | OUTPATIENT
Start: 2023-02-20 | End: 2023-02-19

## 2023-02-19 RX ADMIN — BUSPIRONE HYDROCHLORIDE 30 MG: 10 TABLET ORAL at 21:34

## 2023-02-19 RX ADMIN — TRAZODONE HYDROCHLORIDE 100 MG: 100 TABLET ORAL at 21:33

## 2023-02-19 ASSESSMENT — COLUMBIA-SUICIDE SEVERITY RATING SCALE - C-SSRS
TOTAL  NUMBER OF ABORTED OR SELF INTERRUPTED ATTEMPTS LIFETIME: NO
5. HAVE YOU STARTED TO WORK OUT OR WORKED OUT THE DETAILS OF HOW TO KILL YOURSELF? DO YOU INTEND TO CARRY OUT THIS PLAN?: NO
2. HAVE YOU ACTUALLY HAD ANY THOUGHTS OF KILLING YOURSELF?: YES
3. HAVE YOU BEEN THINKING ABOUT HOW YOU MIGHT KILL YOURSELF?: YES
1. HAVE YOU WISHED YOU WERE DEAD OR WISHED YOU COULD GO TO SLEEP AND NOT WAKE UP?: YES
TOTAL  NUMBER OF INTERRUPTED ATTEMPTS LIFETIME: NO
1. IN THE PAST MONTH, HAVE YOU WISHED YOU WERE DEAD OR WISHED YOU COULD GO TO SLEEP AND NOT WAKE UP?: YES
2. HAVE YOU ACTUALLY HAD ANY THOUGHTS OF KILLING YOURSELF?: NO
6. HAVE YOU EVER DONE ANYTHING, STARTED TO DO ANYTHING, OR PREPARED TO DO ANYTHING TO END YOUR LIFE?: NO
4. HAVE YOU HAD THESE THOUGHTS AND HAD SOME INTENTION OF ACTING ON THEM?: NO
ATTEMPT LIFETIME: NO

## 2023-02-19 ASSESSMENT — ACTIVITIES OF DAILY LIVING (ADL)
ADLS_ACUITY_SCORE: 35

## 2023-02-19 ASSESSMENT — ENCOUNTER SYMPTOMS: NERVOUS/ANXIOUS: 1

## 2023-02-19 NOTE — ED NOTES
"52 year old male received from the ER due to anxiety and panic attacks. When asked what brought patient into the ED today he stated \"I have depression, anxiety, and mild ADHD. I also have ruminating thoughts and panic attacks, reports feeling overwhelmed with work commitments, AA, family and feeling overburdened.\" Patient also reports he has been isolating himself at the sober house. States he was told by one of the residents that he was walking around looking like a deer in headlights. Patient also claimed he had an ashen look. Patient reports he has not been sleeping well. Patient currently denies suicidal ideation. Although he claimed to have had some passive thoughts of not wishing to be around. He said he has no specific plans to act on his thoughts. Patient also endorses symptoms of anxiety which he rated 10/10 and depression rated 8/10. Patient denied any auditory hallucinations, but described seeing colors and seeing things changing shapes. However, he denied any visual hallucinations. Patient told this writer he has been taking Latuda at HS and not with supper. He also last took his medications 2 days ago, except for Latuda and Buspar, which he report last taking 4 days ago.     Nursing assessment complete including patient and medication profiles. Risk assessments completed addressing suicide,fall, nutrition and safety issues. Care plan initiated. Assessments reviewed with LMHP and physician. Video monitoring in progress, patient informed.  Admission information reviewed with patient. Pt given tour of the unit and instructions on using the facility. Questions regarding the unit addressed. Pt search completed and belongings inventoried  "
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
## 2023-02-19 NOTE — ED PROVIDER NOTES
History     Chief Complaint:  Anxiety and Psychiatric Evaluation       The history is provided by the patient and a relative.      Jorge Diana is a 52 year old male with a history of depression and anxiety who presents with anxiety and psychiatric evaluation. He has been taking his medications lately, but has not been eating when taking them. He suspects that this is responsible for his declining mental health. Additionally, he is attending Alcoholics Anonymous for his alcohol abuse, and feels overwhelmed at times. He is currently 40 days sober. He has previously mental health hospitalizations for his anxiety, and the last one occurred 4 years ago. He does not have a history of hypertension or diabetes mellitus. He denies suicidal or homicidal ideations.     Independent Historian: The Brother provides collateral history    Review of External Notes: primary care notes regarding the patient's depression and anxiety were reviewed    ROS:  Review of Systems   Psychiatric/Behavioral: Negative for suicidal ideas. The patient is nervous/anxious.    All other systems reviewed and are negative.    Allergies:  Penicillins     Medications:    Atomoxetine   Latuda   Zoloft   Desyrel     Past Medical History:    Depressive disorder   Fatigue   Inguinal hernia   ADA   Obesity   RLS   ADHD     Past Surgical History:    Colonoscopy w/ polypectomy/bx   Umbilical herniorrhaphy   Paxton teeth extraction     Family History:    Brother - alcoholism   Father - HLD, HTN   Mother - HLD, HTN, stroke, thyroid disease     Social History:  Hx of smoking (former smoker) and current alcohol use; denies smokeless tobacco use or drug use   PCP: Rhea Alexander   The patient presents to the ED with his brother via private vehicle     Physical Exam     Patient Vitals for the past 24 hrs:   BP Temp Temp src Pulse Resp SpO2   02/19/23 1419 (!) 122/97 97.5  F (36.4  C) Temporal 96 20 95 %        Physical Exam  General: Alert, interactive  in mild distress  Head:  Scalp is atraumatic  Eyes:  The pupils are equal, round, and reactive to light    EOM's intact    No scleral icterus  ENT:      Nose:  The external nose is normal  Ears:  External ears are normal     Neck:  Normal range of motion.      There is no rigidity.    Trachea is in the midline         CV:  Regular rate and rhythm    No murmur   Resp:  Breath sounds are clear bilaterally    Non-labored, no retractions or accessory muscle use      GI:  Abdomen is soft, no distension, no tenderness.       MS:  Normal strength in all 4 extremities  Skin:  Warm and dry, No rash or lesions noted.  Neuro: Strength 5/5 x4.      GCS: 15  Psych:  Awake. Alert.  Flat affect.      Denies suicidal ideation    Emergency Department Course   Laboratory:  Labs Ordered and Resulted from Time of ED Arrival to Time of ED Departure   COVID-19 VIRUS (CORONAVIRUS) BY PCR - Normal       Result Value    SARS CoV2 PCR Negative        Emergency Department Course & Assessments:    Interventions:  None     Independent Interpretation (X-rays, CTs, rhythm strip):  None      Consultations/Discussion of Management or Tests:  The patient will be seen by our mental health  in our empath unit    Social Determinants of Health affecting care:  alcohol dependence, currently sober    Assessments:  1421 I obtained history and examined the patient as noted above. I discussed plan for transfer to  the EmPath Unit.    [facility]:  [facility]     Impression & Plan    Medical Decision Making:  Following presentation history and physical examination were performed, the above work-up was undertaken.  Findings are consistent with an acute exacerbation of the patient's anxiety.  He is not acutely suicidal or homicidal, there is no signs of an acute psychosis.  His anxiety symptoms have been worsening over time and I believe would benefit from further evaluation in our empath unit, there is no signs of an acute  toxidrome and I find him to be medically stable for further evaluation in this unit, patient is in agreement with this plan of action.  He was subsequently transferred to the unit.    Diagnosis:    ICD-10-CM    1. Depression with anxiety  F41.8            Scribe Disclosure:  I, George Lieberman, am serving as a scribe at 3:20 PM on 2/19/2023 to document services personally performed by Michael Flores MD based on my observations and the provider's statements to me.    2/19/2023   Michael Flores MD Trigger, Brandon Thomas, MD  02/19/23 [id number]

## 2023-02-20 PROCEDURE — 99233 SBSQ HOSP IP/OBS HIGH 50: CPT | Performed by: NURSE PRACTITIONER

## 2023-02-20 PROCEDURE — 250N000013 HC RX MED GY IP 250 OP 250 PS 637: Performed by: NURSE PRACTITIONER

## 2023-02-20 PROCEDURE — G0378 HOSPITAL OBSERVATION PER HR: HCPCS

## 2023-02-20 RX ORDER — LORAZEPAM 1 MG/1
1 TABLET ORAL 3 TIMES DAILY
Status: DISCONTINUED | OUTPATIENT
Start: 2023-02-20 | End: 2023-02-22

## 2023-02-20 RX ADMIN — SERTRALINE HYDROCHLORIDE 200 MG: 100 TABLET, FILM COATED ORAL at 09:15

## 2023-02-20 RX ADMIN — BUSPIRONE HYDROCHLORIDE 15 MG: 10 TABLET ORAL at 19:49

## 2023-02-20 RX ADMIN — TRAZODONE HYDROCHLORIDE 150 MG: 150 TABLET ORAL at 22:41

## 2023-02-20 RX ADMIN — ATOMOXETINE 80 MG: 80 CAPSULE ORAL at 09:15

## 2023-02-20 RX ADMIN — LORAZEPAM 1 MG: 1 TABLET ORAL at 17:57

## 2023-02-20 RX ADMIN — BUSPIRONE HYDROCHLORIDE 30 MG: 10 TABLET ORAL at 09:15

## 2023-02-20 RX ADMIN — LAMOTRIGINE 200 MG: 100 TABLET ORAL at 13:24

## 2023-02-20 RX ADMIN — Medication 125 MCG: at 09:15

## 2023-02-20 RX ADMIN — LORAZEPAM 1 MG: 1 TABLET ORAL at 19:48

## 2023-02-20 ASSESSMENT — ACTIVITIES OF DAILY LIVING (ADL)
ADLS_ACUITY_SCORE: 35

## 2023-02-20 NOTE — ED NOTES
Patient approached the nursing station asking to speak with the psychiatric provider again. Provider unavailable at this time to speak with patient once more. RN informed patient that he would be meeting with a psychiatric provider again tomorrow too. Patient told RN that he wanted her to know that he forgot to tell her that he has been feeling anxious off and on for a month, and at times has been getting feelings that the muscle is being ripped off of his bones. Patient wanted to know if there is a specific reason he would be feeling like that.

## 2023-02-20 NOTE — ED NOTES
Patient able to sleep last night in the sensory room.  He said he slept well but woke up 3 times wondering how long he slept.  He is currently sleeping in the recliner chair. He had breakfast, took his meds and went to group this am.  His affect is flat.  He spoke a lot about his problem of catatonia. He states that he will stand and stare out the window for an hour and then move to another window and stare.  He said he is usually thinking about things in his life that he is worried about while he is staring.

## 2023-02-20 NOTE — ED PROVIDER NOTES
"EmPATH Unit - Psychiatric Consultation  [facility] Emergency Department    Jorge Diana MRN: [id number]   Age: 52 year old YOB: 1970     History     Chief Complaint   Patient presents with     Anxiety     Psychiatric Evaluation     HPI  Jorge Diana is a 52 year old male with a past history notable for depression, anxiety, alcohol use (in recovery) and cannabis use (in early recovery).  Presented to the emergency department with increase in anxiety in the context of decreased sleep.  Patient was evaluated by the ED provider, who medically cleared patient to transfer to [facility] for psychiatric assessment, this is reviewed along with all pertinent labs and tests performed.    Patient seen for follow-up visit today. He discusses many worries he is experiencing. Talks about being worried about being \"addicted\" to his psychotropic medications. Worries that others at his sober house have seen his pill bottles in his room and worries that they may label him as an addict. Worries about having cash in his pocket because people may think he is going to use this to buy drugs. Discusses inability to hold a job due to difficulty focusing, difficulty maintaining his mental health symptoms. Most recently, applied to [employer] and was unable to make it through the training due to his anxiety and feeling overwhelmed with the pace of the job. Mentions worry about his children. His son was recently given his old truck, but crashed it. Has yet to figure out how to sign over the title, is unable to concentrate on tasks like this. Toward the end of our conversation, patient asks \"so, am I the only patient here? It's pretty clear that other people here are actors.\" Patient discusses that he feels as though we are doing some sort of experiment or research. Educated on the EmPATH unit and the purpose of this unit. Patient makes multiple mentions of wanting to discontinue his psychotropic medications as he does not feel they are " effective. Discussed that Mireya had recommended to discontinue lurasidone. Discussed that I can work on discontinuing buspirone. Encouraged patient to try lorazepam, as it appears this was quite effective during his inpatient stay in 2019. He denies any suicidal or homicidal ideation.       Past Medical History  Past Medical History:   Diagnosis Date     Depressive disorder      Fatigue      Inguinal hernia without mention of obstruction or gangrene, unilateral or unspecified, (not specified as recurrent)      Past Surgical History:   Procedure Laterality Date     COLONOSCOPY N/A 3/6/2020    Procedure: COLONOSCOPY, WITH POLYPECTOMY AND BIOPSY;  Surgeon: Leyla Mock MD;  Location:  GI     HERNIORRHAPHY UMBILICAL  3/2/2012    Procedure:HERNIORRHAPHY UMBILICAL; Surgeon:LILLIAN BROOKE; Location:[facility]     LAPAROSCOPIC HERNIORRHAPHY INGUINAL BILATERAL  3/2/2012    Procedure:LAPAROSCOPIC HERNIORRHAPHY INGUINAL BILATERAL; LAPAROSCOPIC BILATERAL INGUINAL HERNIA REPAIR WITH MESH, OPEN UMBILICAL HERNIA REPAIR WITH MESH ; Surgeon:LILLIAN BROOKE; Location:[facility]     MOUTH SURGERY      WISDOM TEETH EXTRACTION     atomoxetine (STRATTERA) 80 MG capsule  Vitamin D3 (CHOLECALCIFEROL) 125 MCG (5000 UT) tablet  busPIRone HCl (BUSPAR) 30 MG tablet  lamoTRIgine (LAMICTAL) 100 MG tablet  LATUDA 60 MG TABS tablet  sertraline (ZOLOFT) 100 MG tablet      Allergies   Allergen Reactions     Penicillins Itching and Rash     Family History  History reviewed. No pertinent family history.  Social History   Social History     Tobacco Use     Smoking status: Former     Packs/day: 0.50     Years: 5.00     Pack years: 2.50     Types: Cigarettes     Quit date: 2009     Years since quittin.1     Smokeless tobacco: Never   Substance Use Topics     Alcohol use: Yes     Comment: 8 - 10 BEERS PER WEEK     Drug use: No          Review of Systems  A medically appropriate review of systems was performed with pertinent  "positives and negatives noted in the HPI, and all other systems negative.    Physical Examination   BP: (!) 122/97  Pulse: 96  Temp: 97.5  F (36.4  C)  Resp: 20  Height: 177.8 cm (5' 10\")  Weight: 97.7 kg (215 lb 4.8 oz)  SpO2: 95 %    Physical Exam  General: Appears stated age.   Neuro: Alert and fully oriented. Extremities appear to demonstrate normal strength on visual inspection.   Integumentary/Skin: no rash visualized, normal color    Psychiatric Examination   Appearance: awake, alert, adequately groomed, appeared as age stated and casually dressed  Attitude:  cooperative  Eye Contact:  fair  Mood:  anxious  Affect:  mood congruent, intensity is normal and constricted mobility  Speech:  clear, coherent and decreased prosody  Psychomotor Behavior:  no evidence of tardive dyskinesia, dystonia, or tics  Thought Process:  overall linear, with a few illogical statements  Associations:  no loose associations  Thought Content:  no evidence of suicidal ideation or homicidal ideation and paranoid ideation present. No auditory or visual hallucinations noted  Insight:  fair  Judgement:  fair  Oriented to:  time, person, and place  Attention Span and Concentration:  fair  Recent and Remote Memory:  fair  Language: able to name/identify objects without impairment  Fund of Knowledge: intact with awareness of current and past events    ED Course        Labs Ordered and Resulted from Time of ED Arrival to Time of ED Departure   COVID-19 VIRUS (CORONAVIRUS) BY PCR - Normal       Result Value    SARS CoV2 PCR Negative         Assessments & Plan (with Medical Decision Making)   Patient presenting with worsening insomnia and anxiety. Recently moved to sober housing as he and wife . Has been feeling overwhelmed. Experiencing some paranoid ideation here, as well as suspicion for emerging catatonia. Nursing notes reviewed noting no acute issues.     I have reviewed the assessment completed by the [facility].     Preliminary " diagnosis:    ICD-10-CM    1. Insomnia due to other mental disorder  F51.05     F99       2. Severe episode of recurrent major depressive disorder, with psychotic features (H)  F33.3       3. Generalized anxiety disorder  F41.1       4. Catatonia  F06.1     r/o      5. Attention deficit hyperactivity disorder (ADHD), unspecified ADHD type  F90.9            Treatment Plan:  - Decrease buspirone to 15 mg BID due to lack of efficacy. Consider discontinuing.   - Lurasidone has been discontinued  - Continue sertraline 200 mg daily for treatment of anxiety and depression. Consider switching to a different antidepressant. Has been on this for >5 years, does not currently appear to be effective  - Continue lamotrigine 200 mg daily for mood stabilization  - Continue Strattera 80 mg daily for treatment of ADHD symptoms  - Start lorazepam 1 mg TID for suspected catatonia symptoms  - Patient will remain on observation status for further stabilization    --  Ramy Toussaint CNP   [facility] EMERGENCY DEPT  EmPATH Unit  2/19/2023      Ramy Toussaint CNP  02/20/23 [id number]

## 2023-02-20 NOTE — ED NOTES
Patient is visible on the unit and social with peers. He was observed spending time on the phone with family members. Patient's mood remains anxious with full range affect. He was also observed pacing around on occasion.

## 2023-02-20 NOTE — ED NOTES
Just spoke to patient's wife.  Patient has been having episodes every 4-5 weeks of standing in place and staring until she tells him to move.  She states during these episodes he becomes sweaty and his blood pressure rises. She states he is always suicidal.  She feels the mix of meds are not workiing.  He is standing in place right now and staring.

## 2023-02-20 NOTE — ED NOTES
"EmPATH Group Progress Note    Client Name: Jorge Diana  Date: February 20, 2023  Service Type:  Group Therapy  Session Start Time:  9:30am  Session End Time: 9:45am  Session Length: 15 minutes  Attendees: Patient and other group members  Facilitator: EDGARDO Lucas     Topic:   Morning goal setting group    Intervention:    Group process: support, challenge, affirm, psycho-education.     Response:  Patient did participate in group. Behavior in group was appropriate. Patient shared that he was not sure what his goal today is. He stated \"I don't know what I'm doing here.\" Writer provided feedback that he will meet independently with [facility] and psychiatry provider to help determine plan of care.        EDGARDO Lucas        "

## 2023-02-20 NOTE — ED NOTES
Pt slept through the night uneventfully. No signs of distress noted. Respirations appeared to be even and unlabored. Will continue to monitor.

## 2023-02-20 NOTE — PLAN OF CARE
"Jorge Diana  February 19, 2023  Plan of Care Hand-off Note     Patient Care Path: Observation    Plan for Care:     A lower level of care has been unsuccessful in treating and stabilizing patient s mental health symptoms. Patient is identified as low risk via the [facility]-SSRS. He reports he has had passive thoughts of suicide such a s\"what could be my options if I were to kill myself\" but never had any specific plan or intent. He reports that he has not had any of thoughts in last month but having thoughts of \"what if I did not wake up\" that were fleeting. He currently denies any suicidal ideation. Patient reports having a lot of current life stressors as he is currently focusing on his sobriety. Patient identifies wanting to stay on emPATH for the night and have assistance in sleeping.  Patient will remain on EmPATH unit under observation for continued monitoring, treatment and therapeutic intervention of mental health symptoms. Observation at [facility] could help mitigate the need for a more restrictive level of care in an inpatient setting.      Critical Safety Issues: None indicated. Patient is feeling anxious due to lack of sleep and many outside stressors while newly sober.     Overview:  This patient is a child/adolescent: No    This patient has additional special visitor precautions: No    Legal Status: Voluntary    Contacts:   Michael Diana, brother; [phone]    Psychiatry Consult:  Patient followed by empath provider    Updated RN and Attending Provider regarding plan of care.    Earline Vieira, BRIONNA    "

## 2023-02-20 NOTE — ED PROVIDER NOTES
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  Presented to the emergency department with increase in anxiety in the context of decreased sleep.  Patient was evaluated by the ED provider, who medically cleared patient to transfer to [facility] for psychiatric assessment, this is reviewed along with all pertinent labs and tests performed.    Reports he has been working with a provide with [email or web address] and is on multiple medications and this is distressing to him.  He has been having side effects of tremors and muscle weakness.  Anxiety 10/10 last evening.  Has not slept for 4 days.  Recently moved to a sober living home as his wife asked for separation.  Reports had small amount of cannabis, 3 hits, about 45 days ago, and longer sobriety prior to that.   Minimal alcohol since outpatient chemical dependancy treatment in 2019.  Chose sober living due to cost of getting his own place.  Denies suicidal ideation or homicidal ideation.      Psychiatric admission 2019 as follows:  Admit with worsening anxiety and motor symptoms suggestive of catatonia in the setting of daily marijuana use, which is not uncommon for the patient. Records indicate symptoms significantly improved once Ativan was restarted at 1mg TID.  Date of Admission:                         1/18/2019  Date of Discharge:                          1/28/2019  Principal Diagnosis:   #Major depressive disorder, with catatonia     Secondary psychiatric diagnoses of concern this admission:  #Anxiety  #Attention deficit hyperactivity disorder  #Possible  neurocognitive deficit/neuropsychiatric disorder  -MoCA score: 26          OUTPATIENT TEAM:  Psychiatry: [facility] will start with new provider on 3/2/2023 as previous provider left the practice recently       No current facility-administered medications for this encounter.    Current Outpatient Medications:      Vitamin D3 (CHOLECALCIFEROL) 125 MCG (5000 UT) tablet, Take by mouth daily, Disp: , Rfl:      atomoxetine (STRATTERA) 80 MG capsule, Take 80 mg by mouth daily, Disp: , Rfl:      busPIRone HCl (BUSPAR) 30 MG tablet, Take 1 tablet by mouth 2 times daily, Disp: , Rfl:      lamoTRIgine (LAMICTAL) 100 MG tablet, Take 2 tablets by mouth daily at 2 pm, Disp: , Rfl:      LATUDA 60 MG TABS tablet, TAKE ONE TABLET BY MOUTH DAILY AT DINNER, Disp: , Rfl:      sertraline (ZOLOFT) 100 MG tablet, Take 200 mg by mouth daily, Disp: , Rfl:      traZODone (DESYREL) 50 MG tablet, Take 1 tablet (50 mg) by mouth nightly as needed for sleep, Disp: 30 tablet, Rfl: 0    NOTES ABOUT CURRENT PSYCHOTROPIC MEDICATIONS:   Lamotrigine 200 mg (last 4 months approximately)  Latuda 60 mg, not taking with food so likely getting 30 mg.  Of note this is 1500.00 a month out of pocket for patient.  Has been on for about 1 year without clear benefit  Sertraline 200 mg years  Buspirone 30 mg twice a day, over 6 months  Strattera 80 mg since 2019.    PAST PSYCHOTROPIC MEDICATIONS:  Abilify, worsening insomnia   Lorazepam for catatonia in 2019    Past Medical History  Past Medical History:   Diagnosis Date     Depressive disorder      Fatigue      Inguinal hernia without mention of obstruction or gangrene, unilateral or unspecified, (not specified as recurrent)      Past Surgical History:   Procedure Laterality Date     COLONOSCOPY N/A 3/6/2020    Procedure: COLONOSCOPY, WITH POLYPECTOMY AND BIOPSY;  Surgeon: Leyla Mock MD;  Location:  GI     HERNIORRHAPHY UMBILICAL  3/2/2012    Procedure:HERNIORRHAPHY UMBILICAL;  "Surgeon:LILLIAN BROOKE; Location:[facility]     LAPAROSCOPIC HERNIORRHAPHY INGUINAL BILATERAL  3/2/2012    Procedure:LAPAROSCOPIC HERNIORRHAPHY INGUINAL BILATERAL; LAPAROSCOPIC BILATERAL INGUINAL HERNIA REPAIR WITH MESH, OPEN UMBILICAL HERNIA REPAIR WITH MESH ; Surgeon:LILLIAN BROOKE; Location:[facility]     MOUTH SURGERY      WISDOM TEETH EXTRACTION     Vitamin D3 (CHOLECALCIFEROL) 125 MCG (5000 UT) tablet  atomoxetine (STRATTERA) 80 MG capsule  busPIRone HCl (BUSPAR) 30 MG tablet  lamoTRIgine (LAMICTAL) 100 MG tablet  LATUDA 60 MG TABS tablet  sertraline (ZOLOFT) 100 MG tablet  traZODone (DESYREL) 50 MG tablet      Allergies   Allergen Reactions     Penicillins Itching and Rash     Family History  History reviewed. No pertinent family history.  Social History   Social History     Tobacco Use     Smoking status: Former     Packs/day: 0.50     Years: 5.00     Pack years: 2.50     Types: Cigarettes     Quit date: 2009     Years since quittin.1     Smokeless tobacco: Never   Substance Use Topics     Alcohol use: Yes     Comment: 8 - 10 BEERS PER WEEK     Drug use: No          Review of Systems  A medically appropriate review of systems was performed with pertinent positives and negatives noted in the HPI, and all other systems negative.    Physical Examination   BP: (!) 122/97  Pulse: 96  Temp: 97.5  F (36.4  C)  Resp: 20  Height: 177.8 cm (5' 10\")  Weight: 97.7 kg (215 lb 4.8 oz)  SpO2: 95 %    Physical Exam  General: Appears stated age.   Neuro: Alert and fully oriented. Extremities appear to demonstrate normal strength on visual inspection.   Integumentary/Skin: no rash visualized, normal color    Psychiatric Examination   Appearance: awake, alert  Attitude:  cooperative  Eye Contact:  good  Mood:  anxious  Affect:  appropriate and in normal range  Speech:  clear, coherent  Psychomotor Behavior:  no evidence of tardive dyskinesia, dystonia, or tics  Thought Process:  logical and goal " oriented  Associations:  no loose associations  Thought Content:  no evidence of suicidal ideation or homicidal ideation  Insight:  good  Judgement:  intact  Oriented to:  time, person, and place  Attention Span and Concentration:  intact  Recent and Remote Memory:  intact  Language: able to name/identify objects without impairment  Fund of Knowledge: intact with awareness of current and past events    ED Course        Labs Ordered and Resulted from Time of ED Arrival to Time of ED Departure   COVID-19 VIRUS (CORONAVIRUS) BY PCR - Normal       Result Value    SARS CoV2 PCR Negative         Assessments & Plan (with Medical Decision Making)   Patient presenting with increase in anxiety in the context of decreased sleep.  Unable to sleep in the last three nights . Nursing notes reviewed noting no acute issues. Mood is stable, does feel overwhelmed with AA that he has been attempting to go to. Is concerned he is on too many medications and this is causing potential side effects.     I have reviewed the assessment completed by the [facility].     During the observation period, the patient did not require medications for agitation, and did not require restraints/seclusion for patient and/or provider safety.     The patient was found to have a psychiatric condition that would benefit from an observation stay in the emergency department for further psychiatric stabilization and/or coordination of a safe disposition. The observation plan includes serial assessments of psychiatric condition, potential administration of medications if indicated, further disposition pending the patient's psychiatric course during the monitoring period.     Preliminary diagnosis:    ICD-10-CM    1. Hx of moderate episode of major depressive disorder, unspecified whether recurrent (H)  F32.1       2. Insomnia due to other mental disorder  F51.05     F99             Treatment Plan:   -Place on observation status for further crisis stabilization and  medication management.  -Continue home medications including lamotrigine, sertraline, Strattera and buspirone   -discontinue the Latuda as unclear benefit and costs patient 1500.00 monthly  -Currently voluntary status.    -Start trazodone 100 mg targeting sleep wake cycle  -The following as needed medications were ordered:  -hydroxyzine 25-50 mg three times a day as needed anxiety  -ibuprofen 400 mg as needed pain/fever  -olanzapine 10 mg as needed agitation/anxiety  -melatonin 5 mg as needed insomnia   -They report gaining benefit from the therapeutic milieu of the unit.  -Problem focused, supportive therapy provided around patients stressors and symptoms related to their diagnosis.  Validated patients emotions and problems solved with patient around stress management and self care strategies. Patient was receptive.   -Medication education provided this visit includes, rationale for medication, importance of compliance, medication interactions, and common side effects. Patient agreeable.  -The patient was educated regarding their differential diagnoses and the importance of adhering to their treatment plan and outpatient follow up appointments to aid with diagnostic clarity.  -Reassess in the AM     I, Mireya Damon DNP, DONY, [facility], have personally performed an examination of this patient on 02/19/23.  I have edited the note to reflect all relevant changes.  I have discussed this patient with the care team.  I have reviewed all vitals and laboratory findings.    --  DONY Neal CNP   [facility] EMERGENCY DEPT  EmPATH Unit  2/19/2023        Mireya Damon APRN CNP  02/19/23 2001

## 2023-02-21 ENCOUNTER — TELEPHONE (OUTPATIENT)
Dept: BEHAVIORAL HEALTH | Facility: CLINIC | Age: 53
End: 2023-02-21
Payer: COMMERCIAL

## 2023-02-21 LAB
AMPHETAMINES UR QL SCN: NORMAL
BARBITURATES UR QL SCN: NORMAL
BENZODIAZ UR QL SCN: NORMAL
BZE UR QL SCN: NORMAL
CANNABINOIDS UR QL SCN: NORMAL
OPIATES UR QL SCN: NORMAL
PCP QUAL URINE (ROCHE): NORMAL

## 2023-02-21 PROCEDURE — 80307 DRUG TEST PRSMV CHEM ANLYZR: CPT | Performed by: STUDENT IN AN ORGANIZED HEALTH CARE EDUCATION/TRAINING PROGRAM

## 2023-02-21 PROCEDURE — 99233 SBSQ HOSP IP/OBS HIGH 50: CPT | Performed by: NURSE PRACTITIONER

## 2023-02-21 PROCEDURE — G0378 HOSPITAL OBSERVATION PER HR: HCPCS

## 2023-02-21 PROCEDURE — 250N000013 HC RX MED GY IP 250 OP 250 PS 637: Performed by: NURSE PRACTITIONER

## 2023-02-21 RX ORDER — FLUOXETINE 10 MG/1
10 CAPSULE ORAL DAILY
Status: DISCONTINUED | OUTPATIENT
Start: 2023-02-22 | End: 2023-02-22

## 2023-02-21 RX ORDER — SERTRALINE HYDROCHLORIDE 100 MG/1
100 TABLET, FILM COATED ORAL DAILY
Status: DISCONTINUED | OUTPATIENT
Start: 2023-02-22 | End: 2023-02-22

## 2023-02-21 RX ADMIN — ATOMOXETINE 80 MG: 80 CAPSULE ORAL at 12:11

## 2023-02-21 RX ADMIN — BUSPIRONE HYDROCHLORIDE 7.5 MG: 5 TABLET ORAL at 20:30

## 2023-02-21 RX ADMIN — Medication 125 MCG: at 12:11

## 2023-02-21 RX ADMIN — TRAZODONE HYDROCHLORIDE 150 MG: 150 TABLET ORAL at 22:09

## 2023-02-21 RX ADMIN — LORAZEPAM 1 MG: 1 TABLET ORAL at 22:09

## 2023-02-21 RX ADMIN — SERTRALINE HYDROCHLORIDE 200 MG: 100 TABLET, FILM COATED ORAL at 12:11

## 2023-02-21 RX ADMIN — LORAZEPAM 1 MG: 1 TABLET ORAL at 12:11

## 2023-02-21 RX ADMIN — LAMOTRIGINE 150 MG: 100 TABLET ORAL at 15:22

## 2023-02-21 RX ADMIN — BUSPIRONE HYDROCHLORIDE 15 MG: 10 TABLET ORAL at 12:12

## 2023-02-21 RX ADMIN — LORAZEPAM 1 MG: 1 TABLET ORAL at 15:22

## 2023-02-21 ASSESSMENT — ACTIVITIES OF DAILY LIVING (ADL)
ADLS_ACUITY_SCORE: 35

## 2023-02-21 ASSESSMENT — COLUMBIA-SUICIDE SEVERITY RATING SCALE - C-SSRS
SUICIDE, SINCE LAST CONTACT: NO
TOTAL  NUMBER OF ABORTED OR SELF INTERRUPTED ATTEMPTS SINCE LAST CONTACT: NO
1. SINCE LAST CONTACT, HAVE YOU WISHED YOU WERE DEAD OR WISHED YOU COULD GO TO SLEEP AND NOT WAKE UP?: YES
ATTEMPT SINCE LAST CONTACT: NO
2. HAVE YOU ACTUALLY HAD ANY THOUGHTS OF KILLING YOURSELF?: NO
REASONS FOR IDEATION SINCE LAST CONTACT: MOSTLY TO END OR STOP THE PAIN (YOU COULDN'T GO ON LIVING WITH THE PAIN OR HOW YOU WERE FEELING)
TOTAL  NUMBER OF INTERRUPTED ATTEMPTS SINCE LAST CONTACT: NO
6. HAVE YOU EVER DONE ANYTHING, STARTED TO DO ANYTHING, OR PREPARED TO DO ANYTHING TO END YOUR LIFE?: NO

## 2023-02-21 NOTE — PROGRESS NOTES
Pt frequently up at the desk. He is starring into space. Pt requires frequent redirection. Pt is asking If he could discharge but pt reminded it would be beneficial to stay another night and see if the medications he was started on would help with his symptoms. Pt is agreeable at this time. Pt is calm and cooperative. VSS

## 2023-02-21 NOTE — TELEPHONE ENCOUNTER
S: SOLOMON Dent  Leonor calling at 4:45PM  about a 52 year old/Male presenting with catatonic state, paranoid delusions.        B: Pt arrived via Family. Presenting problem, stressors: Pt has been at [facility] for 2 days.  Pt presents with periods of catatonia.  A variety of meds have been tried in [facility], but pt continues to have periods of staring off into space as well as paranoid delusions.  Pt believes he is a part of an experiment in the hospital.  Pt has thoughts of thinking he should not be alive, but denies SI in the ED.  Stressor could be recent break up with wife.    Pt affect in ED: Blunted, cooperative  Pt Dx: Depression, anxiety  Previous [facility] hx? Yes: 2019     Pt denies SI   Hx of suicide attempt? No  Pt denies SIB  Pt denies HI   Pt denies hallucinations .     Hx of aggression/violence, sexual offences, legal concerns, or Epic care plan? describe: No  Current concerns for aggression this visit? No  Does pt have a history of Civil Commitment? No  Is Pt their own guardian? Yes    Pt is prescribed medication. Is patient medication compliant? Yes  Pt endorses OP services: Therapist, psychiatrist  CD concerns: Pt is in recovery with a hx of marijuana and alcohol use .  UTOX requested  Acute or chronic medical concerns: No  Does Pt present with specific needs, assistive devices, or exclusionary criteria? None      Pt is ambulatory  Pt is able to perform ADLs independently      A: Pt to be reviewed for [facility] admission. Pt is Voluntary  Preferred placement: Anywhere but [place] or [place]    COVID:Negative  Utox: Not ordered, intake to request lab    CMP: N/A  CBC: N/A  HCG: N/A    R: Patient cleared and ready for behavioral bed placement: Yes  Pt placed on [facility] worklist? Yes

## 2023-02-21 NOTE — ED NOTES
[facility] Crisis Reassessment      Jorge Diana was reassessed after a period of observation at [facility]. Pt was first seen on 2/19/23 by BRIONNA Mckinley; see the initial assessment note for details.      Patient Presentation    Initial ED presentation details: mild agitation, periods of catatonia, decline in function.    Current patient presentation: continues to have periods of catatonic like symptoms including staring into space, not responding or being able to take in and understand information.  Also at times having paranoid delusions that he is part of an experiment here, that we are listening in on his phone calls.     Changes observed since initial assessment: has been taking medications as prescribed, sleeping a lot, willing to engage with treatment team.  Symptoms of psychosis have lessened a bit.      Risk of Harm    [place] Suicide Severity Rating Scale Full Clinical Version:2/19/23  Suicidal Ideation  1. Wish to be Dead (Lifetime): Yes  1. Wish to be Dead (Past 1 Month): Yes  2. Non-Specific Active Suicidal Thoughts (Lifetime): Yes  2. Non-Specific Active Suicidal Thoughts (Past 1 Month): No  3. Active Suicidal Ideation with any Methods (Not Plan) Without Intent to Act (Lifetime): Yes  3. Active Suicidal Ideation with any Methods (Not Plan) Without Intent to Act (Past 1 Month): No  4. Active Suicidal Ideation with Some Intent to Act, Without Specific Plan (Lifetime): No  5. Active Suicidal Ideation with Specific Plan and Intent (Lifetime): No  Intensity of Ideation  Most Severe Ideation Rating (Lifetime): 1  Most Severe Ideation Rating (Past 1 Month): 1  Frequency (Lifetime): Less than once a week  Frequency (Past 1 Month): Less than once a week  Duration (Lifetime): Less than 1 hour/some of the time  Duration (Past 1 Month): Less than 1 hour/some of the time  Controllability (Lifetime): Easily able to control thoughts  Controllability (Past 1 Month): Easily able to control thoughts  Deterrents  (Lifetime): Deterrents definitely stopped you from attempting suicide  Deterrents (Past 1 Month): Deterrents definitely stopped you from attempting suicide  Suicidal Behavior  Actual Attempt (Lifetime): No  Has subject engaged in non-suicidal self-injurious behavior? (Lifetime): No  Interrupted Attempts (Lifetime): No  Aborted or Self-Interrupted Attempt (Lifetime): No  Preparatory Acts or Behavior (Lifetime): No  C-SSRS Risk (Lifetime/Recent)  Calculated C-SSRS Risk Score (Lifetime/Recent): Low Risk    [place] Suicide Severity Rating Scale Since Last Contact: 02/21/23    Suicidal Ideation (Since Last Contact)  1. Wish to be Dead (Since Last Contact): Yes  Wish to be Dead Description (Since Last Contact): at times feels he's not worth being alive  2. Non-Specific Active Suicidal Thoughts (Since Last Contact): No  Suicidal Behavior (Since Last Contact)  Actual Attempt (Since Last Contact): No  Has subject engaged in non-suicidal self-injurious behavior? (Since Last Contact): No  Interrupted Attempts (Since Last Contact): No  Aborted or Self-Interrupted Attempt (Since Last Contact): No  Preparatory Acts or Behavior (Since Last Contact): No  Suicide (Since Last Contact): No     C-SSRS Risk (Since Last Contact)  Calculated C-SSRS Risk Score (Since Last Contact): Low Risk    Validity of evaluation is not impacted by presenting factors during interview.   Comments regarding subjective versus objective responses to [place] tool: n/a  Environmental or Psychosocial Events: loss of relationship due to divorce/separation, work or task failure, challenging interpersonal relationships, helplessness/hopelessness, unemployment/underemployment, other life stressors and neither working nor attending school  Chronic Risk Factors: history of psychiatric hospitalization, chronic and ongoing sleep difficulties and serious, persistent mental illness   Warning Signs: talking or writing about death, dying, or suicide, hopelessness,  feeling trapped, like there is no way out, increasing substance use or abuse and anxiety, agitation, unable to sleep, sleeping all the time  Protective Factors: strong bond to family unit, community support, or employment, lives in a responsibly safe and stable environment, good treatment engagement, help seeking, sense of self-efficacy and/or positive self-esteem and cultural, spiritual , or Caodaism beliefs associated with meaning and value in life  Interpretation of Risk Scoring, Risk Mitigation Interventions and Safety Plan: Pt on wait list for IP bed    Does the patient have thoughts of harming others? No    Mental Status Exam   Affect: Blunted   Appearance: Appropriate    Attention Span/Concentration: Attentive?    Eye Contact: Engaged   Fund of Knowledge: Appropriate    Language /Speech Content: Fluent   Language /Speech Volume: Soft    Language /Speech Rate/Productions: Minimally Responsive    Recent Memory: Variable   Remote Memory: Intact   Mood: Anxious and Depressed    Orientation to Person: Yes    Orientation to Place: Yes   Orientation to Time of Day: Yes    Orientation to Date: Yes    Situation (Do they understand why they are here?): Yes    Psychomotor Behavior: Underactive    Thought Content: Delusions and Paranoia   Thought Form: Intact and Other: some thought blocking at times       Additional Collateral Information   n/a      Therapeutic Intervention  The following therapeutic methodologies were employed when working with the patient: Establishing rapport, Active listening, Assess dimensions of crisis, Apply solution-focused therapy to address current crisis, Identify additional supports and alternative coping skills, Motivational Interviewing and Brief Supportive Therapy. Patient response to intervention: moderately engaged, pleasant, though with periods of being very slow to respond.    Diagnosis:   311 (F32.9) Unspecified Depressive Disorder    300.00 (F41.9) Unspecified Anxiety Disorder - by  history        Clinical Substantiation of Recommendations  Pt continues to struggle to track well in conversation, has periods of staring into space or having delayed responses. He endorses thoughts that he's not worth being alive, though here in the hospital denies thoughts or intent for suicide. At this time, pt does not appear able to adequately care for himself out in the community.  Recommend admission for further stabilization and med management.     Plan:    Disposition  Recommended disposition: Inpatient Mental Health    Admission to Inpatient Level of Care is indicated due to:    1. Patient risk of severity of behavioral health disorder is appropriate to proposed level of care as indicated by:   Behavioral health disorder is present and appropriate for inpatient care with both of the following:     Severe psychiatric, behavioral or other comorbid conditions are appropriate for management at inpatient mental health as indicated by at least one of the following:   o Hallucinations; delusions; positive symptoms, Negative symptoms and Depressive symptoms and Evidence of severely diminished ability to assess consequences of own actions    Severe dysfunction in daily living is present as indicated by at least one of the following:   o Extreme deterioration in social interactions, Complete inability to maintain any appropriate aspect of personal responsibility in any adult roles and Other evidence of severe dysfunction    2. Inpatient mental health services are necessary to meet patient needs and at least one of the following:  Specific condition related to admission diagnosis is present and judged likely to further improve at proposed level of care    3. Situation and expectations are appropriate for inpatient care, as indicated by one of the following:   Patient management/treatment at lower level of care is not feasible or is inappropriate    Patient Placement contacted @ 4:59 PM and pt placed on their work  list.      Reviewed case and recommendations with attending provider. Attending Name: Seth Toussaint CNP      Attending concurs with disposition: Yes      Patient concurs with disposition: Yes      Final disposition: Inpatient mental health .         Assessment Details  Total duration spent on the patient case in minutes: 2.0 hrs     CPT code(s) utilized: 36408 - Psychotherapy (with patient) - 60 (53+*) min       Sherif Josue, [facility], [facility]  Callback: [phone]

## 2023-02-21 NOTE — ED PROVIDER NOTES
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  Presented to the emergency department with increase in anxiety in the context of decreased sleep.  Patient was evaluated by the ED provider, who medically cleared patient to transfer to [facility] for psychiatric assessment, this is reviewed along with all pertinent labs and tests performed.    Patient seen for evaluation today. He was seated in the consult room, staring. He is unsure if he has felt any different since starting lorazepam last evening. He reports he slept in today, which he sometimes does when he has no reason to be up in the morning. Continues to experience a lot of anxiety and worry. He talks about his difficulty with making decisions, focusing, starting tasks and projects. Talks about how his mind moves very slowly and it is difficult to keep up with this \"fast-paced society.\" He reports there are periods of time where he feels like his brain is working at a normal pace, is able to express his thoughts more easily. Reports that this pattern of his mind processing more slowly tends to be somewhat cyclical in nature, happening around once per month. He continues to express a desire to simplify his medication regimen as it makes him feel like an addict being on so many medications. Discussed the option of pursuing inpatient hospitalization to further evaluate his medication regimen and to ensure he is functioning prior to returning home. He ultimately was agreeable to this.       Past Medical History  Past Medical History:   Diagnosis Date     Depressive disorder      Fatigue      Inguinal hernia without " "mention of obstruction or gangrene, unilateral or unspecified, (not specified as recurrent)      Past Surgical History:   Procedure Laterality Date     COLONOSCOPY N/A 3/6/2020    Procedure: COLONOSCOPY, WITH POLYPECTOMY AND BIOPSY;  Surgeon: Leyla Mock MD;  Location:  GI     HERNIORRHAPHY UMBILICAL  3/2/2012    Procedure:HERNIORRHAPHY UMBILICAL; Surgeon:LILLIAN BROOKE; Location:[facility]     LAPAROSCOPIC HERNIORRHAPHY INGUINAL BILATERAL  3/2/2012    Procedure:LAPAROSCOPIC HERNIORRHAPHY INGUINAL BILATERAL; LAPAROSCOPIC BILATERAL INGUINAL HERNIA REPAIR WITH MESH, OPEN UMBILICAL HERNIA REPAIR WITH MESH ; Surgeon:LILLIAN BROOKE; Location:[facility]     MOUTH SURGERY      WISDOM TEETH EXTRACTION     atomoxetine (STRATTERA) 80 MG capsule  Vitamin D3 (CHOLECALCIFEROL) 125 MCG (5000 UT) tablet  busPIRone HCl (BUSPAR) 30 MG tablet  lamoTRIgine (LAMICTAL) 100 MG tablet  LATUDA 60 MG TABS tablet  sertraline (ZOLOFT) 100 MG tablet      Allergies   Allergen Reactions     Penicillins Itching and Rash     Family History  History reviewed. No pertinent family history.  Social History   Social History     Tobacco Use     Smoking status: Former     Packs/day: 0.50     Years: 5.00     Pack years: 2.50     Types: Cigarettes     Quit date: 2009     Years since quittin.1     Smokeless tobacco: Never   Substance Use Topics     Alcohol use: Yes     Comment: 8 - 10 BEERS PER WEEK     Drug use: No          Review of Systems  A medically appropriate review of systems was performed with pertinent positives and negatives noted in the HPI, and all other systems negative.    Physical Examination   BP: (!) 122/97  Pulse: 96  Temp: 97.5  F (36.4  C)  Resp: 20  Height: 177.8 cm (5' 10\")  Weight: 97.7 kg (215 lb 4.8 oz)  SpO2: 95 %    Physical Exam  General: Appears stated age.   Neuro: Alert and fully oriented. Extremities appear to demonstrate normal strength on visual inspection.   Integumentary/Skin: no rash " visualized, normal color    Psychiatric Examination   Appearance: awake, alert, adequately groomed, appeared as age stated and casually dressed  Attitude:  cooperative  Eye Contact:  fair  Mood:  overwhelmed  Affect:  mood congruent and intensity is blunted  Speech:  clear, coherent and decreased prosody  Psychomotor Behavior:  no evidence of tardive dyskinesia, dystonia, or tics  Thought Process:  linear and goal oriented  Associations:  no loose associations  Thought Content:  no evidence of suicidal ideation or homicidal ideation and no overt paranoia or delusional beliefs elicited today. no evidence of anomalous perceptions  Insight:  fair  Judgement:  fair  Oriented to:  time, person, and place  Attention Span and Concentration:  poor  Recent and Remote Memory:  fair  Language: able to name/identify objects without impairment  Fund of Knowledge: intact with awareness of current and past events    ED Course        Labs Ordered and Resulted from Time of ED Arrival to Time of ED Departure   COVID-19 VIRUS (CORONAVIRUS) BY PCR - Normal       Result Value    SARS CoV2 PCR Negative         Assessments & Plan (with Medical Decision Making)   Patient presenting with worsening insomnia and anxiety. Recently moved to sober housing as he and wife . Has been feeling overwhelmed. Experiencing some paranoid ideation here, as well as suspicion for emerging catatonia. Patient has had a difficult time functioning recently, was not eating prior to coming in. Hx of catatonia in 2019, treated with lorazepam. Continues to experience a great deal of difficulty focusing, concentrating, making decisions. Nursing notes reviewed noting no acute issues.     I have reviewed the assessment completed by the [facility].     Preliminary diagnosis:    ICD-10-CM    1. Insomnia due to other mental disorder  F51.05     F99       2. Severe episode of recurrent major depressive disorder, with psychotic features (H)  F33.3       3. Generalized  anxiety disorder  F41.1       4. Catatonia  F06.1     r/o      5. Attention deficit hyperactivity disorder (ADHD), unspecified ADHD type  F90.9            Treatment Plan:  - Pursue inpatient psychiatric hospitalization for inability to safely care for self and evaluation of medication regimen. Patient has been on a number of medications which have been unsuccessful in treating his symptoms. Patient has struggled to function, had not been eating prior to coming in. Hx of catatonia in 2019 which was successfully treated with lorazepam. One of his goals is to reduce polypharmacy, which is reasonable.   - Will reduce buspirone further to 7.5 mg bid due to lack of efficacy  - Reduce sertraline to 100 mg daily. Has been on sertraline for a number of years and no longer appears effective. Start fluoxetine 10 mg daily tomorrow. Continue cross-titration to fluoxetine over the next week or so.   - Will reduce lamotrigine from 200 mg to 150 mg daily. Patient unable to recall why this was initiated. No hx of manic episodes. Continue further taper.   - Will continue lorazepam 1 mg TID. Patient appears a bit brighter today, possibly fewer staring episodes.   - Continue trazodone 100 mg to 150 mg at bedtime for sleep  - Continue Strattera 80 mg daily for treatment of ADHD symptoms  - Patient would benefit from neuropsychological testing for a cognitive assessment as well as diagnostic clarification. If he does not go inpatient, recommend this be scheduled outpatient.   - Patient will remain on observation status while awaiting an inpatient bed    --  Ramy Toussaint CNP   [facility] EMERGENCY DEPT  EmPATH Unit  2/19/2023      Ramy Toussaint CNP  02/21/23 0824

## 2023-02-21 NOTE — PROGRESS NOTES
7a-3p shift.  Pt spend most of the shift in sensory room C. Pt reports sleeping much better last night and he feels rested. Pt was visible on the unit. He socialized with staff and peer. Pt was able to order food and ate most of his meal.      3p-11p shift  Pt is was visible on the unit. He was observed speaking on the phone. Pt's friend stopped by and dropped off some belongings. Pt was calm and cooperative all shift. Pt isn't sure if he wants to go IP or if he should be discharged in the morning. No acute changes noted this shift. Pt appears to be less catatonic.

## 2023-02-22 ENCOUNTER — TELEPHONE (OUTPATIENT)
Dept: BEHAVIORAL HEALTH | Facility: CLINIC | Age: 53
End: 2023-02-22
Payer: COMMERCIAL

## 2023-02-22 PROCEDURE — 250N000013 HC RX MED GY IP 250 OP 250 PS 637: Performed by: PSYCHIATRY & NEUROLOGY

## 2023-02-22 PROCEDURE — 99233 SBSQ HOSP IP/OBS HIGH 50: CPT | Performed by: PSYCHIATRY & NEUROLOGY

## 2023-02-22 PROCEDURE — 250N000013 HC RX MED GY IP 250 OP 250 PS 637: Performed by: NURSE PRACTITIONER

## 2023-02-22 PROCEDURE — G0378 HOSPITAL OBSERVATION PER HR: HCPCS

## 2023-02-22 RX ORDER — BUSPIRONE HYDROCHLORIDE 5 MG/1
5 TABLET ORAL 2 TIMES DAILY
Status: DISCONTINUED | OUTPATIENT
Start: 2023-02-22 | End: 2023-02-23

## 2023-02-22 RX ORDER — LORAZEPAM 0.5 MG/1
0.5 TABLET ORAL 3 TIMES DAILY
Status: DISCONTINUED | OUTPATIENT
Start: 2023-02-22 | End: 2023-02-23

## 2023-02-22 RX ADMIN — FLUOXETINE 10 MG: 10 CAPSULE ORAL at 11:00

## 2023-02-22 RX ADMIN — LAMOTRIGINE 150 MG: 100 TABLET ORAL at 17:19

## 2023-02-22 RX ADMIN — CLOTRIMAZOLE: 0.01 CREAM TOPICAL at 22:06

## 2023-02-22 RX ADMIN — TRAZODONE HYDROCHLORIDE 100 MG: 100 TABLET ORAL at 21:55

## 2023-02-22 RX ADMIN — BUSPIRONE HYDROCHLORIDE 5 MG: 5 TABLET ORAL at 21:56

## 2023-02-22 RX ADMIN — ATOMOXETINE 80 MG: 80 CAPSULE ORAL at 10:59

## 2023-02-22 RX ADMIN — LORAZEPAM 1 MG: 1 TABLET ORAL at 11:00

## 2023-02-22 RX ADMIN — SERTRALINE HYDROCHLORIDE 100 MG: 100 TABLET, FILM COATED ORAL at 11:00

## 2023-02-22 RX ADMIN — LORAZEPAM 0.5 MG: 0.5 TABLET ORAL at 21:55

## 2023-02-22 RX ADMIN — Medication 125 MCG: at 11:00

## 2023-02-22 RX ADMIN — BUSPIRONE HYDROCHLORIDE 7.5 MG: 5 TABLET ORAL at 11:00

## 2023-02-22 ASSESSMENT — ACTIVITIES OF DAILY LIVING (ADL)
ADLS_ACUITY_SCORE: 35

## 2023-02-22 ASSESSMENT — COLUMBIA-SUICIDE SEVERITY RATING SCALE - C-SSRS
2. HAVE YOU ACTUALLY HAD ANY THOUGHTS OF KILLING YOURSELF?: NO
REASONS FOR IDEATION SINCE LAST CONTACT: MOSTLY TO END OR STOP THE PAIN (YOU COULDN'T GO ON LIVING WITH THE PAIN OR HOW YOU WERE FEELING)
TOTAL  NUMBER OF INTERRUPTED ATTEMPTS SINCE LAST CONTACT: NO
6. HAVE YOU EVER DONE ANYTHING, STARTED TO DO ANYTHING, OR PREPARED TO DO ANYTHING TO END YOUR LIFE?: NO
SUICIDE, SINCE LAST CONTACT: NO
TOTAL  NUMBER OF ABORTED OR SELF INTERRUPTED ATTEMPTS SINCE LAST CONTACT: NO
ATTEMPT SINCE LAST CONTACT: NO
1. SINCE LAST CONTACT, HAVE YOU WISHED YOU WERE DEAD OR WISHED YOU COULD GO TO SLEEP AND NOT WAKE UP?: YES

## 2023-02-22 NOTE — ED PROVIDER NOTES
EmPATH Unit - Psychiatric Consultation  [facility] Emergency Department    Jorge Diana MRN: [id number]   Age: 52 year old YOB: 1970     History     Chief Complaint   Patient presents with     Anxiety     Psychiatric Evaluation     HPI  Jorge Diana is a 52 year old male with history notable for major depressive disorder, anxiety, and substance use disorders currently in remission.  He is currently under observation status on the EmPATH unit, now nearing 73 hours in the emergency department.  Following his meeting with AUGUSTIN Ann yesterday, his antidepressant was changed from Zoloft to Prozac while adding lorazepam to treat catatonia.  The dose of BuSpar was reduced with intent to minimize polypharmacy.  Overnight, there were no acute issues.  On reassessment today, the patient appears to be more engaged and interactive.  He complains of sedation, presumed to be secondary to lorazepam.  He has been eating adequately.  His mood remains depressed and anxious while verbalizing difficulty comprehending information.  He shares with me a note with various concerns he and his wife have noted which I placed into his chart.  He expressed frustration for having to take numerous medications.  He expressed frustration for ongoing symptoms and episodes of catatonia without ability to gain remission of symptoms.  He denied active thoughts of suicide today however continues to express a sense of hopelessness and helplessness.  There was no indication of psychosis or homicidal thoughts.  He is agreeable to the plan of care as outlined.    Past Medical History  Past Medical History:   Diagnosis Date     Depressive disorder      Fatigue      Inguinal hernia without mention of obstruction or gangrene, unilateral or unspecified, (not specified as recurrent)      Past Surgical History:   Procedure Laterality Date     COLONOSCOPY N/A 3/6/2020    Procedure: COLONOSCOPY, WITH POLYPECTOMY AND BIOPSY;  Surgeon: Leyla Mock  "MD Kaylin;  Location:  GI     HERNIORRHAPHY UMBILICAL  3/2/2012    Procedure:HERNIORRHAPHY UMBILICAL; Surgeon:LILLIAN BROOKE; Location:[facility]     LAPAROSCOPIC HERNIORRHAPHY INGUINAL BILATERAL  3/2/2012    Procedure:LAPAROSCOPIC HERNIORRHAPHY INGUINAL BILATERAL; LAPAROSCOPIC BILATERAL INGUINAL HERNIA REPAIR WITH MESH, OPEN UMBILICAL HERNIA REPAIR WITH MESH ; Surgeon:LILLIAN BROOKE; Location:[facility]     MOUTH SURGERY      WISDOM TEETH EXTRACTION     atomoxetine (STRATTERA) 80 MG capsule  Vitamin D3 (CHOLECALCIFEROL) 125 MCG (5000 UT) tablet  busPIRone HCl (BUSPAR) 30 MG tablet  lamoTRIgine (LAMICTAL) 100 MG tablet  LATUDA 60 MG TABS tablet  sertraline (ZOLOFT) 100 MG tablet      Allergies   Allergen Reactions     Penicillins Itching and Rash     Family History  History reviewed. No pertinent family history.  Social History   Social History     Tobacco Use     Smoking status: Former     Packs/day: 0.50     Years: 5.00     Pack years: 2.50     Types: Cigarettes     Quit date: 2009     Years since quittin.1     Smokeless tobacco: Never   Substance Use Topics     Alcohol use: Yes     Comment: 8 - 10 BEERS PER WEEK     Drug use: No          Review of Systems  A medically appropriate review of systems was performed with pertinent positives and negatives noted in the HPI, and all other systems negative.    Physical Examination   BP: (!) 122/97  Pulse: 96  Temp: 97.5  F (36.4  C)  Resp: 20  Height: 177.8 cm (5' 10\")  Weight: 97.7 kg (215 lb 4.8 oz)  SpO2: 95 %    Physical Exam  General: Appears stated age.   Neuro: Alert and fully oriented. Extremities appear to demonstrate normal strength on visual inspection.   Integumentary/Skin: no rash visualized, normal color    Psychiatric Examination   Appearance: awake, alert  Attitude:  cooperative  Eye Contact:  fair  Mood:  anxious and sad   Affect:  intensity is blunted  Speech:  clear, coherent  Psychomotor Behavior:  no evidence of tardive dyskinesia, " dystonia, or tics  Thought Process:  Slightly slow  Associations:  no loose associations  Thought Content:  no evidence of suicidal ideation or homicidal ideation and no evidence of psychotic thought  Insight:  fair  Judgement:  fair  Oriented to:  time, person, and place  Attention Span and Concentration:  limited  Recent and Remote Memory:  fair  Language: able to name/identify objects without impairment  Fund of Knowledge: intact with awareness of current and past events    ED Course        Labs Ordered and Resulted from Time of ED Arrival to Time of ED Departure   COVID-19 VIRUS (CORONAVIRUS) BY PCR - Normal       Result Value    SARS CoV2 PCR Negative     DRUG ABUSE SCREEN 77 URINE (FL, RH, SH) - Normal    Amphetamines Urine Screen Negative      Barbituates Urine Screen Negative      Benzodiazepine Urine Screen Negative      Cannabinoids Urine Screen Negative      Opiates Urine Screen Negative      PCP Urine Screen Negative      Cocaine Urine Screen Negative         Assessments & Plan (with Medical Decision Making)   Patient presenting with heightened anxiety stemming from paranoia in the context of ongoing depression and episodes of catatonia.  His treatment plan is focused on changing his antidepressant medication to better address mood and anxiety symptoms while utilizing Ativan to treat catatonia.  He seems slightly better today regarding symptoms of catatonia however continues to demonstrate difficulty with his thought process and ongoing mood and anxiety symptoms. Nursing notes reviewed noting no acute issues.     I have reviewed the assessment completed by the [facility].     Preliminary diagnosis:    ICD-10-CM    1. Insomnia due to other mental disorder  F51.05     F99       2. Severe episode of recurrent major depressive disorder, with psychotic features (H)  F33.3     rule out bipolar 2 disorder       3. Generalized anxiety disorder  F41.1       4. Catatonia  F06.1     r/o      5. Attention deficit  hyperactivity disorder (ADHD), unspecified ADHD type  F90.9            Treatment Plan:  -Change the dose of Ativan from 1 mg 3 times daily to 0.5 mg 3 times daily to minimize sedating side effects.  Closely monitor symptoms of catatonia as the dose is being reduced.  -Discontinue Zoloft as planned and increase the dose of Prozac to better address mood and anxiety symptoms.  -Continue lamotrigine for affective instability and mood stabilization  -Continue Strattera for treatment of ADHD  -Continue to taper off of BuSpar to minimize polypharmacy; dose will be reduced to 5 mg  -Awaiting bed availability to transfer to inpatient psychiatry voluntarily.    --  Jin Baugh MD   [facility] EMERGENCY DEPT  EmPATH Unit  2/19/2023      Jin Baugh MD  02/22/23 1197

## 2023-02-22 NOTE — ED NOTES
[facility] Crisis Reassessment      Jorge Diana was reassessed while awaiting IP bed placement, for the following reasons: to determine continued appropriateness for inpatient care. Pt was first seen on 2/19/23 by BRIONNA Mckinley; see the initial assessment note for details.      Patient Presentation    Initial ED presentation details: experiencing periods of catatonia, paranoid delusions, disorganized thoughts.    Current patient presentation: a bit less paranoid, less guarded, more engaged, but continuing to appear withdrawn with blunted affect, delayed responses and experiencing paranoid delusions.    Changes observed since initial assessment: has been cooperative, willing to engage with staff and take medications as prescribed.       Risk of Harm    [place] Suicide Severity Rating Scale Full Clinical Version:2/19/23  Suicidal Ideation  1. Wish to be Dead (Lifetime): Yes  1. Wish to be Dead (Past 1 Month): Yes  2. Non-Specific Active Suicidal Thoughts (Lifetime): Yes  2. Non-Specific Active Suicidal Thoughts (Past 1 Month): No  3. Active Suicidal Ideation with any Methods (Not Plan) Without Intent to Act (Lifetime): Yes  3. Active Suicidal Ideation with any Methods (Not Plan) Without Intent to Act (Past 1 Month): No  4. Active Suicidal Ideation with Some Intent to Act, Without Specific Plan (Lifetime): No  5. Active Suicidal Ideation with Specific Plan and Intent (Lifetime): No  Intensity of Ideation  Most Severe Ideation Rating (Lifetime): 1  Most Severe Ideation Rating (Past 1 Month): 1  Frequency (Lifetime): Less than once a week  Frequency (Past 1 Month): Less than once a week  Duration (Lifetime): Less than 1 hour/some of the time  Duration (Past 1 Month): Less than 1 hour/some of the time  Controllability (Lifetime): Easily able to control thoughts  Controllability (Past 1 Month): Easily able to control thoughts  Deterrents (Lifetime): Deterrents definitely stopped you from attempting suicide  Deterrents  (Past 1 Month): Deterrents definitely stopped you from attempting suicide  Suicidal Behavior  Actual Attempt (Lifetime): No  Has subject engaged in non-suicidal self-injurious behavior? (Lifetime): No  Interrupted Attempts (Lifetime): No  Aborted or Self-Interrupted Attempt (Lifetime): No  Preparatory Acts or Behavior (Lifetime): No  C-SSRS Risk (Lifetime/Recent)  Calculated C-SSRS Risk Score (Lifetime/Recent): Low Risk    [place] Suicide Severity Rating Scale Since Last Contact: 02/22/23    Suicidal Ideation (Since Last Contact)  1. Wish to be Dead (Since Last Contact): Yes  Wish to be Dead Description (Since Last Contact): continues to have thoughts that perhaps he does't deserve to be alive and all would be better if he were dead  2. Non-Specific Active Suicidal Thoughts (Since Last Contact): No  Suicidal Behavior (Since Last Contact)  Actual Attempt (Since Last Contact): No  Has subject engaged in non-suicidal self-injurious behavior? (Since Last Contact): No  Interrupted Attempts (Since Last Contact): No  Aborted or Self-Interrupted Attempt (Since Last Contact): No  Preparatory Acts or Behavior (Since Last Contact): No  Suicide (Since Last Contact): No     C-SSRS Risk (Since Last Contact)  Calculated C-SSRS Risk Score (Since Last Contact): Low Risk    Validity of evaluation is not impacted by presenting factors during interview.   Comments regarding subjective versus objective responses to [place] tool: n/a  Environmental or Psychosocial Events: loss of relationship due to divorce/separation, work or task failure, challenging interpersonal relationships, helplessness/hopelessness, unemployment/underemployment, other life stressors and neither working nor attending school  Chronic Risk Factors: history of psychiatric hospitalization, chronic and ongoing sleep difficulties and serious, persistent mental illness   Warning Signs: talking or writing about death, dying, or suicide, hopelessness, feeling trapped,  "like there is no way out, increasing substance use or abuse and anxiety, agitation, unable to sleep, sleeping all the time  Protective Factors: strong bond to family unit, community support, or employment, lives in a responsibly safe and stable environment, good treatment engagement, help seeking, sense of self-efficacy and/or positive self-esteem and cultural, spiritual , or Orthodox beliefs associated with meaning and value in life  Interpretation of Risk Scoring, Risk Mitigation Interventions and Safety Plan: Pt on wait list for IP bed    Does the patient have thoughts of harming others? No    Mental Status Exam   Affect: Blunted   Appearance: Appropriate    Attention Span/Concentration: Attentive?    Eye Contact: Variable   Fund of Knowledge: Appropriate    Language /Speech Content: Fluent   Language /Speech Volume: Soft    Language /Speech Rate/Productions: Minimally Responsive and Normal    Recent Memory: Intact   Remote Memory: Intact   Mood: Depressed    Orientation to Person: Yes    Orientation to Place: Yes   Orientation to Time of Day: Yes    Orientation to Date: Yes    Situation (Do they understand why they are here?): Yes    Psychomotor Behavior: Normal and Underactive    Thought Content: Delusions and Paranoia   Thought Form: Intact       Additional Collateral Information   Spoke with wife at length.  She reports that several months ago, she began to keep track of what she now sees as cycles that pt appears to go through.  She states that he will go through a week or so of appearing more 'up' or 'high' during which he's very active, social, out fishing, but also can be quite irritable.  This then is followed by a period of anxiety, sometimes with panic attacks for a few days.  He can become quite easily overwhelmed and little things become big stressors for him.  This then seems to trigger an episode in which he is \"in and out\" with symptoms of catatonia for about 4-6 days followed by depression and " "then back to being \"up\".  Wife reports that they are , but that she is very willing to be a part of his support team when he returns to sober housing or home.  She is currently out of the country, but returns on Sat.       Therapeutic Intervention  The following therapeutic methodologies were employed when working with the patient: Establishing rapport, Active listening, Assess dimensions of crisis, Apply solution-focused therapy to address current crisis, Identify additional supports and alternative coping skills, Motivational Interviewing and Brief Supportive Therapy. Patient response to intervention: moderately engaged.    Diagnosis:   311 (F32.9) Unspecified Depressive Disorder    300.00 (F41.9) Unspecified Anxiety Disorder - by history        Clinical Substantiation of Recommendations  Pt continues to struggle to track well in conversation, has periods of staring into space or having delayed responses. He endorses thoughts that he's not worth being alive, though here in the hospital denies thoughts or intent for suicide. At this time, pt does not appear able to engage and interact appropriately, nor care adequately for himself out in the community.  Recommend admission for further stabilization and med management.     Plan:    Disposition  Recommended disposition: Inpatient Mental Health    Admission to Inpatient Level of Care is indicated due to:     1. Patient risk of severity of behavioral health disorder is appropriate to proposed level of care as indicated by:   Behavioral health disorder is present and appropriate for inpatient care with both of the following:     Severe psychiatric, behavioral or other comorbid conditions are appropriate for management at inpatient mental health as indicated by at least one of the following:   ? Hallucinations; delusions; positive symptoms, Negative symptoms and Depressive symptoms and Evidence of severely diminished ability to assess consequences of own " actions    Severe dysfunction in daily living is present as indicated by at least one of the following:   ? Extreme deterioration in social interactions, Complete inability to maintain any appropriate aspect of personal responsibility in any adult roles and Other evidence of severe dysfunction     2. Inpatient mental health services are necessary to meet patient needs and at least one of the following:  Specific condition related to admission diagnosis is present and judged likely to further improve at proposed level of care     3. Situation and expectations are appropriate for inpatient care, as indicated by one of the following:   Patient management/treatment at lower level of care is not feasible or is inappropriate  Reviewed case and recommendations with attending provider. Attending Name: Dr Baugh      Attending concurs with disposition: Yes      Patient concurs with disposition: Yes      Final disposition: Inpatient mental health .         Assessment Details  Total duration spent on the patient case in minutes: 1.50 hrs     CPT code(s) utilized: 11218 - Psychotherapy (with patient) - 60 (53+*) min       Sherif Josue, [facility], [facility]  Callback: [phone]

## 2023-02-22 NOTE — PLAN OF CARE
Jorge Diana  February 22, 2023  Plan of Care Hand-off Note     Patient Care Path: Inpatient Mental Health    Plan for Care:     Pt continues to struggle to track well in conversation, has periods of staring into space, or having delayed responses. He endorses thoughts that he's not worth being alive, though here in the hospital denies thoughts or intent for suicide. At this time, pt does not appear able to adequately care for himself in the community.  Recommend admission for further stabilization and med management.        Critical Safety Issues: periods of catatonia such as staring into space, not responding or displaying delayed responses.     Overview:  This patient is a child/adolescent: No    This patient has additional special visitor precautions: No    Legal Status: Voluntary    Contacts:   Michael Diana, brother; [phone]    Psychiatry Consult:  Psychiatry Consult not requested because pt was seen by psychiatry at [facility] RN regarding plan of care.    Sherif Josue, JIMSW

## 2023-02-22 NOTE — TELEPHONE ENCOUNTER
Updated Bed Search @ 552pm  Per chart review, intake can look in the metro area for placement     [facility] has 0 appropriate beds available. Phone: [phone]  [facility] posting 0 available beds. Phone: [phone]  [facility] posting 0 available beds. Phone: [phone]  [facility] posting 0 available beds. Phone: [phone]  [facility] posting 0 available beds. Phone: [phone]  [facility] posting 0 available beds. Phone:[phone]  [facility] posting 0 available beds. Phone: [phone]. Negative covid required.   [facility] posting 0 available beds. Phone: [phone]     Pt remains on work list pending appropriate bed placement.

## 2023-02-22 NOTE — TELEPHONE ENCOUNTER
Updated Bed Search @ 11:00pm  Per chart review, intake can look in the state for placement      [facility] has 0 appropriate beds available. Phone: [phone]  [facility] posting 0 available beds. Phone: [phone]  [facility] posting 0 available beds. Phone: [phone]  [facility] posting 0 available beds. Phone: [phone]  [place] posting 0 available beds. Phone: [phone]  [facility] posting 0 available beds. Phone:[phone]  [facility] posting 0 available beds. Phone: [phone]. Negative covid required.   [facility] posting 0 available beds. Phone: [phone]   [place] posting 0 available beds. Phone: [phone] Negative covid required. Low acuity only.   [facility] posting 0 available beds. Phone: [phone]  [facility] posting 0 available beds. Phone: [phone]. Need a negative covid. Mixed unit with adolescents, adults, geriatric patients. Age 12 and up. Low acuity referrals only.   [place] posting 0 available beds. Phone: [phone]. No aggression. Negative covid required.   [facility] posting 0 available beds. Phone: [phone]  [facility] posting 1 available beds. Phone: [phone]. Covid negative. Per call to [facility], they are at cap for the night.   [place] posting 0 available beds. Phone: [phone]. No current aggression. Negative covid required.   [facility] posting 0 available beds. Phone:[phone]  [facility] posting 0 available beds. Phone: [phone]. Very low acuity only.   [facility] posting 0 available beds. Phone:[phone]  [facility] posting 1 available beds. Phone: [phone]. Per call to [place], they are at cap for the night.   [facility] posting 0 available beds. Phone: [phone]. Voluntary patients only. Negative covid required.   [facility] posting 0 available beds. Phone: [phone]   [facility] posting 0 available beds. Phone: [phone]   [facility] posting 9 available beds. Phone:  [phone]. Must have cognitive ability to do programming.  Pt discussed with RN on the unit who feels pt is too acute and was not willing to review. No aggression or violence, or recent history of such in the last 2 years. Mental health must be primary. Negative covid required. Pt not appropriate.   [facility] posting 0 available beds. Phone: [phone]  [facility] posting 0 available beds. Phone: [phone]. Low acuity only. Negative covid required.   [facility] posting 0 available beds. Phone: [phone]  [facility] posting 0 available beds. Phone: [phone]. Covid negative required. Voluntary admissions only. No history or recent aggression or violence. No registered sex offenders. Does not provide Detox or CD services.    [place] posting 0 available beds. Phone: [phone]  [facility] posting 0 available beds. Phone: [phone]  [facility] posting 3 available beds. Phone: [phone].  Per , pt is unwilling to go as far as [place] or [place].  [place] is similar distance.       Pt remains on work list pending appropriate bed placement.

## 2023-02-22 NOTE — PROGRESS NOTES
Pt spent most of this shift resting in his recliner. He reports that he slept most of the night and feels rested. Pt was compliant with his cares and medications. Pt denies  AV/H. He denies SI or HI. No acute changes noted this shift.

## 2023-02-22 NOTE — ED NOTES
Pt slept through the night uneventfully (in his recliner). No signs of distress noted. Respirations were even and unlabored. Will continue to monitor.

## 2023-02-22 NOTE — TELEPHONE ENCOUNTER
No appropriate beds are currently available within the  system. Bed search update (statewide - No [place] or [place]) @ 12:30AM:      [facility]: @ cap per website  [facility]: @ cap per website. Per Shayy @ 00:36 they are full tonight   [facility]: @ cap per website. Per Aarti @ 00:37 they do not have beds and suggests calling back after discharges this AM.   [facility]: @ cap per website. Per Michelle @ 00:39 they are at capacity and are boarding  [place]: @ cap per website  [facility]: Posting 1 bed. Mixed unit/Low acuity only. Per [place] @ 03:33 they are reviewing multiple referrals and suggests calling back after 9AM  [facility]: @ cap per website  [facility]: @ cap per website  [facility]: Posting 1 bed. Per Roberta @ 02:30 they do not have beds at this location  [facility]: @ cap per website  [facility]: Posting 1 bed. Per Roberta @ 02:30 they only have very low acuity beds available- pt not appropriate for current beds avail  [facility]: @ cap per website  [facility]: Posting 9 beds. (Low acuity only. Must have the cognitive ability to do programming. No aggressive or violent behavior or recent HX in the last 2 yrs. MH must be primary).  [facility]: @ cap per website  [facility]: @ cap per website  [facility]: @ cap per website  [facility]: Posting 3 beds. Mixed unit. Per Michlele @ 03:20 no appropriate beds available. Mixed unit.        Pt remains on work list until appropriate placement is available

## 2023-02-22 NOTE — TELEPHONE ENCOUNTER
Updated Bed Search @ 1104am  Per chart review, intake can look in the metro area and 2 hours out for placement     *of note, [facility] transport is not leaving the metro area at this time d/t weather. Because of this, [facility].     [facility] has 0 appropriate beds available. Phone: [phone]  [facility] posting 0 available beds. Phone: [phone]  [facility] posting 0 available beds. Phone: [phone]  [facility] posting 0 available beds. Phone: [phone]. Per call to [facility], they only have very low acuity beds available, pt not appropriate.   [facility] posting 0 available beds. Phone: [phone]  [facility] posting 0 available beds. Phone:[phone]  [facility] posting 0 available beds. Phone: [phone]. Negative covid required.   [facility] posting 0 available beds. Phone: [phone]     Pt remains on work list pending appropriate bed placement.

## 2023-02-23 ENCOUNTER — TELEPHONE (OUTPATIENT)
Dept: BEHAVIORAL HEALTH | Facility: CLINIC | Age: 53
End: 2023-02-23
Payer: COMMERCIAL

## 2023-02-23 PROCEDURE — 250N000013 HC RX MED GY IP 250 OP 250 PS 637: Performed by: NURSE PRACTITIONER

## 2023-02-23 PROCEDURE — G0378 HOSPITAL OBSERVATION PER HR: HCPCS

## 2023-02-23 PROCEDURE — 250N000013 HC RX MED GY IP 250 OP 250 PS 637: Performed by: PSYCHIATRY & NEUROLOGY

## 2023-02-23 PROCEDURE — 99233 SBSQ HOSP IP/OBS HIGH 50: CPT | Performed by: PSYCHIATRY & NEUROLOGY

## 2023-02-23 RX ORDER — LORAZEPAM 0.5 MG/1
0.5 TABLET ORAL 2 TIMES DAILY
Status: DISCONTINUED | OUTPATIENT
Start: 2023-02-23 | End: 2023-02-24 | Stop reason: HOSPADM

## 2023-02-23 RX ORDER — MAGNESIUM HYDROXIDE/ALUMINUM HYDROXICE/SIMETHICONE 120; 1200; 1200 MG/30ML; MG/30ML; MG/30ML
30 SUSPENSION ORAL ONCE
Status: COMPLETED | OUTPATIENT
Start: 2023-02-23 | End: 2023-02-23

## 2023-02-23 RX ORDER — TRAZODONE HYDROCHLORIDE 100 MG/1
100 TABLET ORAL
Status: DISCONTINUED | OUTPATIENT
Start: 2023-02-23 | End: 2023-02-24 | Stop reason: HOSPADM

## 2023-02-23 RX ORDER — LAMOTRIGINE 100 MG/1
200 TABLET ORAL DAILY
Status: DISCONTINUED | OUTPATIENT
Start: 2023-02-24 | End: 2023-02-24 | Stop reason: HOSPADM

## 2023-02-23 RX ADMIN — ALUMINUM HYDROXIDE, MAGNESIUM HYDROXIDE, AND SIMETHICONE 30 ML: 200; 200; 20 SUSPENSION ORAL at 19:26

## 2023-02-23 RX ADMIN — LORAZEPAM 0.5 MG: 0.5 TABLET ORAL at 14:33

## 2023-02-23 RX ADMIN — Medication 125 MCG: at 11:44

## 2023-02-23 RX ADMIN — LAMOTRIGINE 150 MG: 100 TABLET ORAL at 14:33

## 2023-02-23 RX ADMIN — LORAZEPAM 0.5 MG: 0.5 TABLET ORAL at 21:26

## 2023-02-23 RX ADMIN — CLOTRIMAZOLE: 0.01 CREAM TOPICAL at 22:16

## 2023-02-23 RX ADMIN — LORAZEPAM 0.5 MG: 0.5 TABLET ORAL at 11:44

## 2023-02-23 RX ADMIN — BUSPIRONE HYDROCHLORIDE 5 MG: 5 TABLET ORAL at 11:44

## 2023-02-23 RX ADMIN — ATOMOXETINE 80 MG: 80 CAPSULE ORAL at 11:44

## 2023-02-23 RX ADMIN — FLUOXETINE 20 MG: 20 CAPSULE ORAL at 11:44

## 2023-02-23 RX ADMIN — TRAZODONE HYDROCHLORIDE 100 MG: 100 TABLET ORAL at 21:26

## 2023-02-23 ASSESSMENT — ACTIVITIES OF DAILY LIVING (ADL)
ADLS_ACUITY_SCORE: 35

## 2023-02-23 NOTE — TELEPHONE ENCOUNTER
No appropriate beds are currently available within the [facility].     *[facility] transport is not leaving the metro area currently d/t weather. Because of this, will keep bed search to the metro area at this time*    Bed search update @ 12AM:       [facility]: @ cap per website  [facility]: @ cap per website  [facility]: [facility] call @ 00:06[facility]  [facility]: @ cap per website  [facility]: @ cap per website  [facility]: @ cap per website  [place]: @ cap per website    Pt remains on work list until appropriate placement is available

## 2023-02-23 NOTE — TELEPHONE ENCOUNTER
Updated Bed Search @ 8:40am    Per chart review, intake can look in the state for placement         *[facility] is not leaving the metro area currently d/t weather. Because of this, will keep bed search to the [facility] area*         [facility] has 0 appropriate beds available. Phone: [phone]    [facility] posting 0 available beds. Phone: [phone]    [facility] posting 0 available beds. Phone: [phone]    [facility] posting 0 available beds. Phone: [phone]. Per call to [facility], they only have very low acuity beds available, pt not appropriate.    [facility] posting 0 available beds. Phone: [phone]    [facility] posting 0 available beds. Phone:[phone]    [facility] posting 0 available beds. Phone: [phone]. Negative covid required.    [facility] posting 0 available beds. Phone: [phone]         Pt remains on waitlist pending available bed.

## 2023-02-23 NOTE — ED NOTES
Patient calm and cooperative this shift. However, appears slow to respond at times during conversation. Patient is aware he is on the wait list for inpatient. However, he would like to talk with the psychiatric provider about possibly discharging instead of going inpatient. Also, patient would like to clarify with the psychiatric provider if he should be taking 200mg of Lamictal like he was at home. Currently his dose is ordered at 150mg.

## 2023-02-23 NOTE — PROGRESS NOTES
Pt is calm, pleasant and cooperative this shift. Pt slept until about 1130am. Pt reports sleeping well last night. Pt ate breakfast and spent the rest of the shift resting in his recliner. No acute changes noted this shift.

## 2023-02-23 NOTE — ED PROVIDER NOTES
EmPATH Unit - Psychiatric Consultation  [facility] Emergency Department    Jorge Diana MRN: [id number]   Age: 52 year old YOB: 1970     History     Chief Complaint   Patient presents with     Anxiety     Psychiatric Evaluation     HPI  Jorge Diana is a 52 year old male with history notable for major depressive disorder, anxiety, who is currently under observation status on the EmPATH unit, now nearing 99 hours in the emergency department.  Overnight, there were no acute issues.  On reassessment today, the patient notes no reemergence of catatonia although states that he continues to struggle with thought organization.  He is sleeping well at night and feels tired during the day.  He spent most of her time together taking notes on a sheet of paper.  He had questions about his medications, frustrated that he needs to take so many medications, and not interpreting any meaningful benefits yet.  He states that his treatment goals are to improve his focus and concentration so that he may better verbalize his thoughts.  He denied active suicidal or homicidal thoughts.  He denied psychotic symptoms.    Past Medical History  Past Medical History:   Diagnosis Date     Depressive disorder      Fatigue      Inguinal hernia without mention of obstruction or gangrene, unilateral or unspecified, (not specified as recurrent)      Past Surgical History:   Procedure Laterality Date     COLONOSCOPY N/A 3/6/2020    Procedure: COLONOSCOPY, WITH POLYPECTOMY AND BIOPSY;  Surgeon: Leyla Mock MD;  Location:  GI     HERNIORRHAPHY UMBILICAL  3/2/2012    Procedure:HERNIORRHAPHY UMBILICAL; Surgeon:LILLIAN BROOKE; Location:[facility]     LAPAROSCOPIC HERNIORRHAPHY INGUINAL BILATERAL  3/2/2012    Procedure:LAPAROSCOPIC HERNIORRHAPHY INGUINAL BILATERAL; LAPAROSCOPIC BILATERAL INGUINAL HERNIA REPAIR WITH MESH, OPEN UMBILICAL HERNIA REPAIR WITH MESH ; Surgeon:LILLIAN BROOKE; Location:[facility]     MOUTH  "SURGERY      WISDOM TEETH EXTRACTION     atomoxetine (STRATTERA) 80 MG capsule  Vitamin D3 (CHOLECALCIFEROL) 125 MCG (5000 UT) tablet  busPIRone HCl (BUSPAR) 30 MG tablet  lamoTRIgine (LAMICTAL) 100 MG tablet  LATUDA 60 MG TABS tablet  sertraline (ZOLOFT) 100 MG tablet      Allergies   Allergen Reactions     Penicillins Itching and Rash     Family History  History reviewed. No pertinent family history.  Social History   Social History     Tobacco Use     Smoking status: Former     Packs/day: 0.50     Years: 5.00     Pack years: 2.50     Types: Cigarettes     Quit date: 2009     Years since quittin.1     Smokeless tobacco: Never   Substance Use Topics     Alcohol use: Yes     Comment: 8 - 10 BEERS PER WEEK     Drug use: No          Review of Systems  A medically appropriate review of systems was performed with pertinent positives and negatives noted in the HPI, and all other systems negative.    Physical Examination   BP: (!) 122/97  Pulse: 96  Temp: 97.5  F (36.4  C)  Resp: 20  Height: 177.8 cm (5' 10\")  Weight: 97.7 kg (215 lb 4.8 oz)  SpO2: 95 %    Physical Exam  General: Appears stated age.   Neuro: Alert and fully oriented. Extremities appear to demonstrate normal strength on visual inspection.   Integumentary/Skin: no rash visualized, normal color    Psychiatric Examination   Appearance: awake, alert  Attitude:  cooperative  Eye Contact:  fair  Mood:  anxious and better  Affect:  mood congruent  Speech:  clear, coherent  Psychomotor Behavior:  no evidence of tardive dyskinesia, dystonia, or tics  Thought Process:  disorganized and Mild  Associations:  no loose associations  Thought Content:  no evidence of suicidal ideation or homicidal ideation and no evidence of psychotic thought  Insight:  partial  Judgement:  fair  Oriented to:  time, person, and place  Attention Span and Concentration:  limited  Recent and Remote Memory:  fair  Language: able to name/identify objects without impairment  Fund of " Knowledge: intact with awareness of current and past events    ED Course        Labs Ordered and Resulted from Time of ED Arrival to Time of ED Departure   COVID-19 VIRUS (CORONAVIRUS) BY PCR - Normal       Result Value    SARS CoV2 PCR Negative     DRUG ABUSE SCREEN 77 URINE (FL, RH, SH) - Normal    Amphetamines Urine Screen Negative      Barbituates Urine Screen Negative      Benzodiazepine Urine Screen Negative      Cannabinoids Urine Screen Negative      Opiates Urine Screen Negative      PCP Urine Screen Negative      Cocaine Urine Screen Negative         Assessments & Plan (with Medical Decision Making)   Patient presenting with heightened anxiety stemming from paranoia in the context of ongoing depression and episodes of catatonia.  Symptoms have been improving slowly throughout the course of his treatment on the EmPATH unit.  His treatment plan is focused on optimizing mood stabilizing treatments while minimizing polypharmacy as much as possible.  He has been awaiting transfer to inpatient psychiatry to ensure symptom stability and adequate improvements before transitioning back to the community. Nursing notes reviewed noting no acute issues.     I have reviewed the assessment completed by the [facility].     Preliminary diagnosis:    ICD-10-CM    1. Insomnia due to other mental disorder  F51.05     F99       2. Severe episode of recurrent major depressive disorder, with psychotic features (H)  F33.3     rule out bipolar 2 disorder       3. Generalized anxiety disorder  F41.1       4. Catatonia  F06.1     r/o      5. Attention deficit hyperactivity disorder (ADHD), unspecified ADHD type  F90.9            Treatment Plan:  -Reduce Ativan from 0.5 mg 3 times a day to twice a day as we aim to find the least effective dose needed to maintain remission of catatonia.  -Continue Prozac 20 mg daily for mood and anxiety management while noting Zoloft has been discontinued due to limited benefit.  -Increase lamotrigine from  150 mg to 200 mg daily to reach our target dose for optimal mood stabilization  -Continue Strattera for treatment of ADHD  -Discontinue BuSpar as we have been tapering off to minimize polypharmacy.  -Awaiting bed availability to transfer to inpatient psychiatry voluntarily.    --  Jin Baugh MD   [facility] EMERGENCY DEPT  EmPATH Unit  2/19/2023      Jin Baugh MD  02/23/23 2504

## 2023-02-24 ENCOUNTER — TELEPHONE (OUTPATIENT)
Dept: BEHAVIORAL HEALTH | Facility: CLINIC | Age: 53
End: 2023-02-24
Payer: COMMERCIAL

## 2023-02-24 VITALS
TEMPERATURE: 99.6 F | SYSTOLIC BLOOD PRESSURE: 121 MMHG | WEIGHT: 215.3 LBS | OXYGEN SATURATION: 93 % | BODY MASS INDEX: 30.82 KG/M2 | DIASTOLIC BLOOD PRESSURE: 89 MMHG | HEART RATE: 115 BPM | RESPIRATION RATE: 16 BRPM | HEIGHT: 70 IN

## 2023-02-24 LAB
ALBUMIN SERPL BCG-MCNC: 3.9 G/DL (ref 3.5–5.2)
ALP SERPL-CCNC: 95 U/L (ref 40–129)
ALT SERPL W P-5'-P-CCNC: 14 U/L (ref 10–50)
ANION GAP SERPL CALCULATED.3IONS-SCNC: 9 MMOL/L (ref 7–15)
AST SERPL W P-5'-P-CCNC: 15 U/L (ref 10–50)
BILIRUB SERPL-MCNC: 0.4 MG/DL
BUN SERPL-MCNC: 16.2 MG/DL (ref 6–20)
CALCIUM SERPL-MCNC: 8.8 MG/DL (ref 8.6–10)
CHLORIDE SERPL-SCNC: 100 MMOL/L (ref 98–107)
CREAT SERPL-MCNC: 1.13 MG/DL (ref 0.67–1.17)
DEPRECATED HCO3 PLAS-SCNC: 24 MMOL/L (ref 22–29)
ERYTHROCYTE [DISTWIDTH] IN BLOOD BY AUTOMATED COUNT: 12.5 % (ref 10–15)
GFR SERPL CREATININE-BSD FRML MDRD: 79 ML/MIN/1.73M2
GLUCOSE SERPL-MCNC: 109 MG/DL (ref 70–99)
HCT VFR BLD AUTO: 47 % (ref 40–53)
HGB BLD-MCNC: 15.7 G/DL (ref 13.3–17.7)
MCH RBC QN AUTO: 30.7 PG (ref 26.5–33)
MCHC RBC AUTO-ENTMCNC: 33.4 G/DL (ref 31.5–36.5)
MCV RBC AUTO: 92 FL (ref 78–100)
PLATELET # BLD AUTO: 219 10E3/UL (ref 150–450)
POTASSIUM SERPL-SCNC: 4.1 MMOL/L (ref 3.4–5.3)
PROT SERPL-MCNC: 6.9 G/DL (ref 6.4–8.3)
RBC # BLD AUTO: 5.11 10E6/UL (ref 4.4–5.9)
SODIUM SERPL-SCNC: 133 MMOL/L (ref 136–145)
WBC # BLD AUTO: 6.9 10E3/UL (ref 4–11)

## 2023-02-24 PROCEDURE — 85027 COMPLETE CBC AUTOMATED: CPT | Performed by: STUDENT IN AN ORGANIZED HEALTH CARE EDUCATION/TRAINING PROGRAM

## 2023-02-24 PROCEDURE — G0378 HOSPITAL OBSERVATION PER HR: HCPCS

## 2023-02-24 PROCEDURE — 250N000013 HC RX MED GY IP 250 OP 250 PS 637: Performed by: NURSE PRACTITIONER

## 2023-02-24 PROCEDURE — 80053 COMPREHEN METABOLIC PANEL: CPT | Performed by: STUDENT IN AN ORGANIZED HEALTH CARE EDUCATION/TRAINING PROGRAM

## 2023-02-24 PROCEDURE — 36415 COLL VENOUS BLD VENIPUNCTURE: CPT | Performed by: STUDENT IN AN ORGANIZED HEALTH CARE EDUCATION/TRAINING PROGRAM

## 2023-02-24 PROCEDURE — 250N000013 HC RX MED GY IP 250 OP 250 PS 637: Performed by: PSYCHIATRY & NEUROLOGY

## 2023-02-24 PROCEDURE — 99239 HOSP IP/OBS DSCHRG MGMT >30: CPT | Performed by: PSYCHIATRY & NEUROLOGY

## 2023-02-24 RX ORDER — LORAZEPAM 0.5 MG/1
0.5 TABLET ORAL AT BEDTIME
Qty: 3 TABLET | Refills: 0 | Status: SHIPPED | OUTPATIENT
Start: 2023-02-24 | End: 2023-02-27

## 2023-02-24 RX ADMIN — LORAZEPAM 0.5 MG: 0.5 TABLET ORAL at 09:56

## 2023-02-24 RX ADMIN — HYDROXYZINE HYDROCHLORIDE 50 MG: 50 TABLET, FILM COATED ORAL at 05:00

## 2023-02-24 RX ADMIN — FLUOXETINE 20 MG: 20 CAPSULE ORAL at 09:56

## 2023-02-24 RX ADMIN — IBUPROFEN 600 MG: 600 TABLET ORAL at 09:57

## 2023-02-24 RX ADMIN — ATOMOXETINE 80 MG: 80 CAPSULE ORAL at 09:55

## 2023-02-24 RX ADMIN — Medication 125 MCG: at 09:56

## 2023-02-24 ASSESSMENT — COLUMBIA-SUICIDE SEVERITY RATING SCALE - C-SSRS
ATTEMPT SINCE LAST CONTACT: NO
1. SINCE LAST CONTACT, HAVE YOU WISHED YOU WERE DEAD OR WISHED YOU COULD GO TO SLEEP AND NOT WAKE UP?: NO
SUICIDE, SINCE LAST CONTACT: NO
TOTAL  NUMBER OF ABORTED OR SELF INTERRUPTED ATTEMPTS SINCE LAST CONTACT: NO
6. HAVE YOU EVER DONE ANYTHING, STARTED TO DO ANYTHING, OR PREPARED TO DO ANYTHING TO END YOUR LIFE?: NO
TOTAL  NUMBER OF INTERRUPTED ATTEMPTS SINCE LAST CONTACT: NO
2. HAVE YOU ACTUALLY HAD ANY THOUGHTS OF KILLING YOURSELF?: NO

## 2023-02-24 ASSESSMENT — ACTIVITIES OF DAILY LIVING (ADL)
ADLS_ACUITY_SCORE: 35

## 2023-02-24 NOTE — ED NOTES
Patient sleeping until mid morning.  Woke up with a headache of 9.  He also has body aches and a temp of 99.6.  His morning meds were given and Ibuprofen was given.  He had labs done this am in anticipation for inpatient admission.

## 2023-02-24 NOTE — ED NOTES
Patient eating, drinking and taking medications as prescribed. No catatonia observed this shift. Patient is calm and cooperative. Patient took some Maalox for an upset stomach around dinnertime. Patient said he is consuming more milk while here on the unit than he normally would at home. Therefore, he attributes this to his stomach upset. Patient aware he is now around #24 on the inpatient wait list.

## 2023-02-24 NOTE — ED PROVIDER NOTES
"EmPATH Unit - Psychiatric Observation Discharge Summary  [facility] Emergency Department  Discharge Date: 2/24/2023    Jorge Diana MRN: [id number]   Age: 52 year old YOB: 1970     Brief HPI & Initial ED Course     Chief Complaint   Patient presents with     Anxiety     Psychiatric Evaluation     HPI  Jorge Diana is a 52 year old male with history notable for major depressive disorder, anxiety, and a substance use disorder in remission.  He is currently under observation status, now nearing 118 hours in the emergency department.  Overnight, there were no acute issues.  On reassessment today, the patient notes improvement in his mood.  Anxiety is well managed.  He has been sleeping well although maintaining sleep was a little more difficult last night.  He notes that he has been napping during the day.  He is eating adequately.  He mostly feels bored on the unit.  He denied suicidal and homicidal thoughts.  There was no indication of psychosis.  He is tolerating his medications without side effects.        Physical Examination   BP: 121/89  Pulse: 115  Temp: 99.6  F (37.6  C)  Resp: 16  Height: 177.8 cm (5' 10\")  Weight: 97.7 kg (215 lb 4.8 oz)  SpO2: 93 %    Physical Exam  General: Appears stated age.   Neuro: Alert and fully oriented. Extremities appear to demonstrate normal strength on visual inspection.   Integumentary/Skin: no rash visualized, normal color    Psychiatric Examination   Appearance: awake, alert  Attitude:  cooperative  Eye Contact:  good  Mood:  better  Affect:  appropriate and in normal range  Speech:  clear, coherent  Psychomotor Behavior:  no evidence of tardive dyskinesia, dystonia, or tics  Thought Process:  logical and linear  Associations:  no loose associations  Thought Content:  no evidence of suicidal ideation or homicidal ideation and no evidence of psychotic thought  Insight:  fair  Judgement:  intact  Oriented to:  time, person, and place  Attention Span and " Concentration:  intact  Recent and Remote Memory:  fair  Language: able to name/identify objects without impairment  Fund of Knowledge: intact with awareness of current and past events    Results        Labs Ordered and Resulted from Time of ED Arrival to Time of ED Departure   COMPREHENSIVE METABOLIC PANEL - Abnormal       Result Value    Sodium 133 (*)     Potassium 4.1      Chloride 100      Carbon Dioxide (CO2) 24      Anion Gap 9      Urea Nitrogen 16.2      Creatinine 1.13      Calcium 8.8      Glucose 109 (*)     Alkaline Phosphatase 95      AST 15      ALT 14      Protein Total 6.9      Albumin 3.9      Bilirubin Total 0.4      GFR Estimate 79     COVID-19 VIRUS (CORONAVIRUS) BY PCR - Normal    SARS CoV2 PCR Negative     DRUG ABUSE SCREEN 77 URINE (FL, RH, SH) - Normal    Amphetamines Urine Screen Negative      Barbituates Urine Screen Negative      Benzodiazepine Urine Screen Negative      Cannabinoids Urine Screen Negative      Opiates Urine Screen Negative      PCP Urine Screen Negative      Cocaine Urine Screen Negative     CBC WITH PLATELETS - Normal    WBC Count 6.9      RBC Count 5.11      Hemoglobin 15.7      Hematocrit 47.0      MCV 92      MCH 30.7      MCHC 33.4      RDW 12.5      Platelet Count 219         Observation Course   The patient was found to have a psychiatric condition that would benefit from an observation stay in the emergency department for further psychiatric stabilization and/or coordination of a safe disposition. The plan upon observation admission included serial assessments of psychiatric condition, potential administration of medications if indicated, further disposition pending the patient's psychiatric course during the monitoring period.     Serial assessments of the patient's psychiatric condition were performed. Nursing notes were reviewed. During the observation period, the patient did not require medications for agitation, and did not require restraints/seclusion for  patient and/or provider safety.     After a period of working with the treatment team on the EmPATH unit, the patient's mental state improved to allow a safe transition to outpatient care. After counseling on the diagnosis, work-up, and treatment plan, the patient was discharged. Close follow-up with a psychiatrist and/or therapist was recommended and community psychiatric resources were provided. Patient is to return to the ED if any urgent or potentially life-threatening concerns.     Discharge Diagnoses:   Final diagnoses:   Insomnia due to other mental disorder   Severe episode of recurrent major depressive disorder, with psychotic features (H) - rule out bipolar 2 disorder    Generalized anxiety disorder   Catatonia - r/o   Attention deficit hyperactivity disorder (ADHD), unspecified ADHD type       Treatment Plan:  -Reduce Ativan to 0.5 mg at bedtime for 3 nights then may be discontinued.  Catatonia has not reemerged as the dose of Ativan has been gradually reduced.  -Continue Prozac 20 mg daily for mood and anxiety management.  -Continue the higher dose of lamotrigine at 200 mg daily for mood stabilization  -Referral for intensive outpatient programming  -Resume outpatient medication management appointments and psychotherapy  -Utilize AA meetings in the community for sobriety support  -Discharge back to his sober home today.      At the time of discharge, the patient's acute suicide risk was determined to be low due to the following factors: Reduction in the intensity of mood/anxiety symptoms that preceded the admission, denial of suicidal thoughts, denies feeling helpless or helpless, not currently under the influence of alcohol or illicit substances, denies experiencing command hallucinations, no immediate access to firearms. The patient's acute risk could be higher if noncompliant with their treatment plan, medications, follow-up appointments or using illicit substances or alcohol. Protective factors  include: social supports, stable housing    I spent more than 30 minutes on discharge day activities.    --  Jin Baugh MD  [facility] EMERGENCY DEPT  EmPATH Unit  2/24/2023      Jin Baugh MD  02/24/23 1139

## 2023-02-24 NOTE — ED NOTES
All belongings which where brought into the hospital have been returned to patient. Escorted off the unit at 1350 accompanied by staff. Discharged to home with his brother.

## 2023-02-24 NOTE — ED NOTES
"[facility] Crisis Reassessment      Jorge Diana was reassessed for the following reasons: Inpatient mental health, awaiting bed placement. Pt was first seen on 02/19/2023 by BRIONNA Mckinley; see the initial assessment note for details.      Patient Presentation    Initial ED presentation details:   Patient presents to the emergency room with his brother due to increased and anxiety. He did report a history of anxiety, depression, and substance use. Patient states he has recently  from his wife and moved into sober living. He has been attending Alcoholics Anonymous, has a therapist that he sees weekly, and a new psychiatrist that he will be working with in a month. He states that his anxiety has been increasing for approximately the last 2 months and even more so in the last week, ruminating thoughts of all the things he has to do and not doing them/not understanding what he needs to do. Patient currently denies suicidal ideation and homicidal ideation.      Patient states that last night his increasing anxiety came to a peak when he has been \"catatonic\"; pacing, not leaving his room, not eating or drinking, unable to sleep, staring at the curtains all day, for the past 3 days. He has been feeling frustrated and sad. Reports seeing things shake or \"light trails\" and hearing a buzzing noise in bursts. He reports termors and muscle weakness. Patient stated that everything had come to the point of feeling like he was dying last night and that is when he knew he had to come in. He called his brother for assistance in coming to the emergency room.      Patient states he hopes to have his medications address as he feels they have not been helpful and that he is not sure he needs to be on so many. He is agreeable to staying on emPATH for the night and reassessment in the morning.     Current patient presentation:   Writer spoke with Pt after Pt took a shower on the unit. Writer introduced himself and informed Pt " of Writer being a LMHP and checking in with Pt as he awaits inpatient mental health bed placement. Pt asked the only thing he needs from Writer is a magic fix to get better and discharge. Pt laughed after asking the question. Pt stated feeling stuck and unsure what his next steps are. Writer validated Pt's thoughts and feelings. Writer informed Pt if he needed or wanted to to discuss anything, Writer would be available from now until 0700 on 02/24/2023. Pt stated he should not need anything due to hoping to be asleep and getting rest. When prompted by Writer, Pt denied any safety concerns and felt safe while on the unit. Writer reiterated plan of having Pt seek inpatient placement to which Pt agreed. Pt also verbalized understanding if he needs something to ask for Writer or a nurse.    Changes observed since initial assessment:   Brighter affect, laughing after making a joke with Writer. Taking a shower and engaging in ADLs. During reassessment, no overt symptoms of psychosis.    Risk of Harm    Due to plan of continuing to pursue inpatient mental health services and Pt's voluntary status, no updates needed at this time. Pt reported feeling safe on the unit and did not voice any safety concerns.    Does the patient have thoughts of harming others? No    Mental Status Exam   Affect: Appropriate   Appearance: Appropriate    Attention Span/Concentration: Attentive?    Eye Contact: Engaged   Fund of Knowledge: Appropriate    Language /Speech Content: Fluent   Language /Speech Volume: Normal    Language /Speech Rate/Productions: Normal    Recent Memory: Intact   Remote Memory: Intact   Mood: Anxious    Orientation to Person: Yes    Orientation to Place: Yes   Orientation to Time of Day: Yes    Orientation to Date: Yes    Situation (Do they understand why they are here?): Yes    Psychomotor Behavior: Normal    Thought Content: Clear and Other: recent delusions and paranoia   Thought Form: Intact       Additional  Collateral Information   NA      Therapeutic Intervention  The following therapeutic methodologies were employed when working with the patient: Establishing rapport, Active listening, Assess dimensions of crisis and Apply solution-focused therapy to address current crisis. Patient response to intervention: cooperative and engaged.    Diagnosis:   311 (F32.9) Unspecified Depressive Disorder    300.00 (F41.9) Unspecified Anxiety Disorder - by history     Clinical Substantiation of Recommendations  Continue with established plan agreed upon by psychiatric provider and Pt and await inpatient mental health services.   It is the recommendation of this clinician that the patient be admitted to inpatient mental health care for further treatment, stabilization and safety. Attempts at managing mental health symptoms and maintaining safety at a lower level of care have been unsuccessful. Patient s current mental health symptoms are requiring a secure setting due to: pt is displaying symptoms of psychosis that are impairing ability to function safely in the community.       Plan:    Disposition  Recommended disposition: Inpatient Mental Health    Admission to Inpatient Level of Care is indicated due to:     1. Patient risk of severity of behavioral health disorder is appropriate to proposed level of care as indicated by:   Behavioral health disorder is present and appropriate for inpatient care with both of the following:     Severe psychiatric, behavioral or other comorbid conditions are appropriate for management at inpatient mental health as indicated by at least one of the following:   ? Hallucinations; delusions; positive symptoms, Negative symptoms and Depressive symptoms and Evidence of severely diminished ability to assess consequences of own actions    Severe dysfunction in daily living is present as indicated by at least one of the following:   ? Extreme deterioration in social interactions, Complete inability to  maintain any appropriate aspect of personal responsibility in any adult roles and Other evidence of severe dysfunction     2. Inpatient mental health services are necessary to meet patient needs and at least one of the following:  Specific condition related to admission diagnosis is present and judged likely to further improve at proposed level of care     3. Situation and expectations are appropriate for inpatient care, as indicated by one of the following:   Patient management/treatment at lower level of care is not feasible or is inappropriate        Reviewed case and recommendations with attending provider. Attending Name: Dr. Baugh     Attending concurs with disposition: Yes       Patient concurs with disposition: Yes       Final disposition: Inpatient mental health .         Assessment Details  Total duration spent on the patient case in minutes: .25 hrs     CPT code(s) utilized: 74383 - Psychotherapy (with patient) - 30 (16-37*) min       Loy Haddad [facility], [facility]  Callback: [phone]

## 2023-02-24 NOTE — TELEPHONE ENCOUNTER
[place]: [place]  Access Inpatient Bed Call Log 2/24/2023  @7:56 am  Facilities that have not updated websites in the last 12 hours have been called.      Adults:    (metro only):    [facility] is posting 0 beds.       [facility] is posting 0 beds. [facility] is posting 0 beds. -   per call at 8:01 am to [facility], they have 2 stepdown beds (must be coop, low acuity only, no psychosis or aggression);     [facility] are posting 0 beds.      [facility] has 0 beds. Extensive wait has extensive wait list for committed patients       Per DEC staff at 10:36 am, pt is discharging. Pt's name was moved to the discharge list in intake. anthony

## 2023-02-24 NOTE — ED NOTES
Discharge instructions reviewed with patient including follow-up care plan. Educated on medication regime and advised not to stop prescribed medication without consulting their physician. Ativan and Prozac sent home with the patient. Reviewed safety plan and outpatient resources.Denies suicidal ideation.

## 2023-02-24 NOTE — TELEPHONE ENCOUNTER
Updated Bed Search @ 607pm  Per chart review, intake can look in the metro and two hours out for placement     *[facility] transport is not leaving the [facility]. Because of this, intake will limit the bed search to the [facility] area*    [facility] has 0 appropriate beds available. Phone: [phone]  [facility] posting 0 available beds. Phone: [phone]  [facility] posting 0 available beds. Phone: [phone]  [facility] posting 0 available beds. Phone: [phone]  [facility] posting 0 available beds. Phone: [phone]  [facility] posting 0 available beds. Phone:[phone]  [facility] posting 0 available beds. Phone: [phone]. Negative covid required.   [facility] posting 0 available beds. Phone: [phone]     Pt remains on work list pending appropriate bed placement.

## 2023-02-24 NOTE — DISCHARGE INSTRUCTIONS
Aftercare Plan    1-follow up with outpatient psychiatry provider within 1 week.   2-follow up with previously established outpatient mental health therapist.  3-follow up with [facility] for diagnostic assessment for programmatic care.      If I am feeling unsafe or I am in a crisis, I will:   Contact my established care providers   Call the National Suicide Prevention Lifeline: 988  Go to the nearest emergency room   Call 911     Warning signs that I or other people might notice when a crisis is developing for me: increase in intensity of depression and anxiety, catatonia, inability to start or complete daily tasks, change in thought process, thoughts of harming self or others.     Things I am able to do on my own to cope or help me feel better:   Grounding Techniques:  Try to notice where you are, your surroundings including the people, the sounds like the TV or radio.  Concentrate on your breathing. Take a deep cleansing breath from your diaphragm. Count the breaths as you exhale. Make sure you breath slowly.  Hold something that you find comforting, for some it may be a stuffed animal or a blanket. Notice how it feels in your hands. Is it hard or soft?  During a non-crisis time make a list of positive affirmations. Print them out and keep them handy for times of intense anxiety. At those times, read them aloud.  Try the FIXO game:  Name 5 things you can see in the room with you  Name 4 things you can feel ( chair on my back  or  feet on floor )   Name 3 things you can hear right now ( people talking  or  tv )   Name 2 things you can smell right now (or, 2 things you like the smell of)   Name 1 good thing about yourself  Create A Safe Place  Image a safe place -- it can be a real or imaginary place:   What do you see -- especially colors?   What sounds do you hear?   What sensations do you feel?   What smells do you smell?   What people or animals would you want in your safe place?   Imagine a protective  bubble, wall or boundary around your safe place.   Imagine a door or gate with a guard at your safe place.   Image a lock and key to your safe place and only you can unlock it.  You can draw or make a collage that represents your safe place.   Choose a souvenir of your safe place -- a color, an object, a song.   Keep your image of your safe place so you can come back to it when you need to.    Things that I am able to do with others to cope or help me better: do an activity I enjoy with a family member or friend, attend an AA meeting    Things I can use or do for distraction:  go for a walk, call someone, go to an AA meeting       Changes I can make to support my mental health and wellness: maintain a regular eating and sleeping schedule, continue to follow up with outpatient therapy and medication management providers, engage in activities I enjoy.      People in my life that I can ask for help: brother, wife, friends, AA support groups, therapist, psychiatrist    [facility] has a mental health crisis team you can call 24/7: [facility] Mobile Crisis  [phone]     Additional resources and information: Substance Use Disorder Direct Access Resources    It is recommended that you abstain from all mood-altering chemicals. Please contact the sober support hotline (289-191-2981) as needed; phones are answered 24 hours a day, 7 days a week.     To access substance use treatment you must have a comprehensive assessment completed to begin any treatment program.     If uninsured, please contact your county of residence for eligibility screen for substance use disorder evaluation and treatment.   [place] - [phone]  [facility] [phone]  [facility] [phone]  [facility] [phone]  [facility] [phone]  [facility] [phone]  [facility] [phone]  [place] - [phone]    If you have private insurance, call the customer service number on the back of your insurance card to find an in-network substance  use disorder assessment. The ideal provider will be a treatment facility, licensed in the state of [place].     The following facilities also offer Direct Access Substance Use Disorder assessments:    [facility]  [phone]  Mon-Friday: [address] [facility], [address]  Saturday:  [address] [facility], [address]  M-F assessments (7a-1:45p); Saturday assessments (7:45-10:45a)    [facility]  [phone]  [address] [place], [place], [address]  *by appointment only M-W; walk ins available Fridays from 10-2.    [facility]  [phone] (phone consultation available 24/7)  In-person Assessments: [address] [facility], [facility] [address], [place] [address]  [address] [address] [place] [address]  [address] [place] [address]  [address] [place] [address]    [facility]  [phone]  [address] [place], [place] [address], [place], [address]  *walk in and appointments available by calling    [facility]  [phone] [address] [place], [place], [address]  *by appointment only M-Th     [facility]  [phone]  [address] [place] [address], [address]  *walk ins available M-F    [facility]  [phone] [address] [place], [place], [address]  *available by appointments only    [facility]  [phone]  [address] [place] [address], [address]  *available by appointment only    [facility] (ADAP)  [phone]  [address] [place] [address], [address]  *Walk in assessments also available M-F starting at 8 am.    [facility]  [phone] [address] [place], [place], [address]  *available by appointments    [facility]  [phone]  [address] [place], [place], [address]  *walk in assessments available M-F starting at 7 am.    [facility]  [phone]  [place] [address], [place], [place], [address]  *Walk in assessments available M-F starting at 8 am OR [phone]    [facility]  [address], [place] [address]  *Walk in assessments available M-F starting at 8 am    [facility]  [phone]  [address] [place] [address]    [facility]  [phone]  [address] [facility], [place], [place], [address]  *available by appointment only    [facility]  [phone]  [address] [facility] [address], [place], [place], [address]    [facility]  [phone]  [address], [place], [address]  *available by appointment    [facility]  [phone]  [address], [place], [address]  *walk ins available M-TH from 9-3    [facility]  [phone]  [address], [place], [place], [address]    [place]  [phone]  [address] [place] [address]  [address] [place] [address] [address], [place] [address]    [facility]  [email or web address]  [phone]  [address] [place] [address], [address], [place] [address]    [facility]  [email or web address]://www.[email or web address]/our-services/drug-alcohol-treatment  [phone], [address], [place] [address]  [phone], [address], [place], [place] [id number] [phone], [address] [facility], [address], [place], [place] [address] [phone], [address] [facility] , [address], ([facility] [place], [place] [id number] [phone], [address] [address] [place] [address]    If you are intoxicated, you may be required to detox at a detox facility before starting treatment. The following are detox facilities where you may seek services:    [facility]: [address] [place], [place], [address] [phone]  [facility]: [address] [place], [place], [address] [phone]  [facility]: [address] [place], [place], [address]   [phone]  [facility]: [address], [place], [address]  [phone]  [facility]: [address], [place], [address] [phone]     Ways to help cope with sobriety:  Take prescribed medicines as scheduled  Keep follow-up appointments  Talk to others about your concerns  Get regular exercise  Practice deep breathing skills  Eat a healthy diet  Use community resources, including hotline numbers, [facility] crisis and support meetings  Stay sober and avoid places/people/things associated with substance use  Maintain a daily schedule/routine  Get at least 7-8 hours of sleep per night  Create a list 10--20 healthy activities that you can do that are enjoyable and do not involve substance use  Create daily goals (approx. 1-4 goals) per day and work to achieve them throughout the day    [facility] ([facility]): www.[facility]ner[facility].[employer]  [phone]  [facility] connects people seeking recovery to resources that help foster and sustain long-term recovery. Whether you are seeking resources for treatment, transportation, housing, job training, education, health care or other pathways to recovery, [facility] is a great place to start. (Great listing of all types of recovery and non-recovery related resources)    [facility]: https://www.smartrecBioquimicay.org/    Alcoholics Anonymous: 3-800-ALCOHOL  HTTP://WWW.AA.ORG/  AA [place] (816-978-8464 or http://aamin[place].org)  AA [place] (169-778-2421 or www.aastpaul.org)    Narcotics Anonymous: 792.539.8952  www.nam[place].us.    [facility]: [address] [place] [address], [address], [place], [place]  [phone]  Drop-in Hours: Monday-Friday 9-11:30 am. By appointment at other times.  [facility] is a drop-in center on the east side [facility] that provides a safe space for individuals who are homeless and have a history of chemical use. Sobriety is not a requirement but drugs and alcohol are not allowed on the  property.  Non-clients can access drop-in services such as Recovery and Harm Reduction Groups, referrals to case management, community activities, shower facilities, and a pool table. Individuals who are homeless and have chemical health needs may be eligible for enrollment into [facility]'s case management program. Clients and  work together to access benefits, treatment, health care, shelter, and external housing resources.     [facility] Chemical Assessment & Referral [facility]: [address] [place] [address]  [phone]  Monday-Friday 8 am-5 pm  Substance use disorder comprehensive assessment and referral for individuals seeking treatment or counseling for chemical dependency. Must be a resident of [facility]. There is no fee for assessment. There is some funding available for treatment programs.    [facility]: [facility]  [phone] or [phone]  Monday - Friday 7 am - 5 pm  Daily drop-in substance use disorder comprehensive assessment and weekly mental health assessments and services. Outpatient chemical dependency treatment (with sober recovery housing), relapse prevention, case management, and employment services. Outpatient and residential treatment for women with children. Specializes in assisting individuals with a history of relapse who face multiple barriers to achieving stable recovery. No charge for most services or services can be billed through insurance. Gender-specific services and treatment for co-occurring substance abuse and mental health concerns offered.    [facility]: [facility], [address] [place] [address], First Floor, [address], [place] [address] (next to the outpatient lab)  [phone]  Serves people ages 16 and older  Open 5 days/week Walk-in hours: Monday, Wednesday, Friday 9:00 am - 11:00 am and 1:00 pm - 3:00 pm and Tuesday, Thursday 1:30 pm - 4:00 pm  Provides bridging services to people with  "Opiate Use Disorders (OUD) seeking care. This is a front door to Medication Assisted Treatments (MAT), Peer Recovery and Nursing services and referrals to Substance Use Disorder (JACKY) Assessments.      Crisis Lines  Crisis Text Line  Text 160637  You will be connected with a trained live crisis counselor to provide support.    Por delfino, texto  GARY a 434554 o texto a 442-AYUDAME en WhatsApp    The Kalpesh Project (LGBTQ Youth Crisis Line)  6.785.313.1632  text START to 103-288      Community Resources  Fast Tracker  Linking people to mental health and substance use disorder resources  [facility] [employer]     [place] Mental Health Warm Line  Peer to peer support  Monday thru Saturday, 12 pm to 10 pm  165.189.7691 or 5.014.119.6126  Text \"Support\" to [address]    National [place] on Mental Illness (JEWEL)  084.724.8489 or 1.888.JEWEL.HELPS      Mental Health Apps  My3  https://Dish.fm.org/    VirtualHopeBox  https://Nutricate/apps/virtual-hope-box/      Additional Information  Today you were seen by a licensed mental health professional through Triage and Transition services, [facility] ([facility])  for a crisis assessment in the Emergency Department at [facility].  It is recommended that you follow up with your established providers (psychiatrist, mental health therapist, and/or primary care doctor - as relevant) as soon as possible. Coordinators from [facility] will be calling you in the next 24-48 hours to ensure that you have the resources you need.  You can also contact [facility] coordinators directly at [phone]. You may have been scheduled for or offered an appointment with a mental health provider. [facility] maintains an extensive network of licensed behavioral health providers to connect patients with the services they need.  We do not charge providers a fee to participate in our referral network.  We match patients with providers based on a patient's specific needs, insurance coverage, " and location.  Our first effort will be to refer you to a provider within your care system, and will utilize providers outside your care system as needed.

## 2023-02-24 NOTE — TELEPHONE ENCOUNTER
No appropriate beds are currently available within the  system. Bed search update (metro + 2 HRS, per ZHANE) @ 3AM:       [facility]: @ cap per website  [facility] system: @ cap per website. Per Janine @ 02:54 they are at capacity and won t have an update until 10AM  [facility]: @ cap per website  [facility]: @ cap per website  [facility]: @ cap per website  [facility] [place]: @ cap per website  [facility]: @ cap per website  [facility]: @ cap per website  [facility]: Does not review after 10PM.    [facility] ([facility]): Posting 1 bed. Per Dara @ 03:02 they have beds available in [place] and [place], but cannot accept anyone w/ hx of aggression, currently on methadone or w/ a criminal hx. Dara also reports they cannot start review process without all labwork.   [facility]: Posting 1 bed. (No hx of aggression. Voluntary admissions only). Per Vanita @ 03:27 they are full but may have discharges later today     Called Filipe @ 03:30 re: labs for [facility] to review - CBC, CMP, salicylate, acetaminophen and BAL    Pt remains on work list until appropriate placement is available    Received call from Filipe RN @ 05:03 reporting that pt does not want to seek placement at [facility] as it is too far, requests metro only at this time. [facility] updated.

## 2023-02-24 NOTE — ED NOTES
"[facility] Crisis Reassessment      Jorge Diana was reassessed at the request of care team for the following reasons: long wait time for admission, pt may be ready to DC. Pt was first seen on 2/19/23 by Earline Vieira; see the initial assessment note for details.      Patient Presentation    Initial ED presentation details: Pt initially arrived at the ED with his brother due to increase anxiety and depression. Pt expressed ruminating thoughts of all the things he had to do, and not knowing what to do or understanding what he needed to do.  Pt reported feeling \"catatonic,\" pacing, not leaving his room, not eating or drinking, and inability to sleep, staring at curtains for the past 3 day PTA. Pt reported seeing things shake and \"light trails,\" and hearing buzzing noises in bursts. Pt also reported muscle weakness and tremors, and feeling like he was dying. Pt denied SI or HI.     Current patient presentation: Writer met with pt in [facility] unit. Pt woke to writer's voice, and willingly met with writer after having a few moments to wake up. Pt reports body aches and throbbing headache. Pt reports he did not sleep well last night due to this. Pt reports he took Trazodone but feels like it never kicked in. Pt reports his mood has been \"fine, not happy, not mad,\" over the past day. Pt denies SI, denies HI, denies any current A/V H.  Pt reports his anxiety has decreased since being here and he feels like things are going alright with the medication changes he has had in [facility].     Pt reports he has OP MH therapy and OP psychiatry. Pt reports he is starting with a new OP psychiatry provider, and has an appointment scheduled for 3/2/23. When asked out discharge versus inpatient, pt expresses he would just want to check from a medication standpoint if he would be ready to discharge. Pt reports he feels he would be able to engage with outpatient supports upon discharge.     Changes observed since initial assessment: Pt reports " physical symptoms of headache and body aches. Pt reports decrease in anxiety and a willingness to engage with outpatient supports.       Risk of Harm    [place] Suicide Severity Rating Scale Full Clinical Version:2/19/23  Suicidal Ideation  1. Wish to be Dead (Lifetime): Yes  1. Wish to be Dead (Past 1 Month): Yes  2. Non-Specific Active Suicidal Thoughts (Lifetime): Yes  2. Non-Specific Active Suicidal Thoughts (Past 1 Month): No  3. Active Suicidal Ideation with any Methods (Not Plan) Without Intent to Act (Lifetime): Yes  3. Active Suicidal Ideation with any Methods (Not Plan) Without Intent to Act (Past 1 Month): No  4. Active Suicidal Ideation with Some Intent to Act, Without Specific Plan (Lifetime): No  5. Active Suicidal Ideation with Specific Plan and Intent (Lifetime): No  Intensity of Ideation  Most Severe Ideation Rating (Lifetime): 1  Most Severe Ideation Rating (Past 1 Month): 1  Frequency (Lifetime): Less than once a week  Frequency (Past 1 Month): Less than once a week  Duration (Lifetime): Less than 1 hour/some of the time  Duration (Past 1 Month): Less than 1 hour/some of the time  Controllability (Lifetime): Easily able to control thoughts  Controllability (Past 1 Month): Easily able to control thoughts  Deterrents (Lifetime): Deterrents definitely stopped you from attempting suicide  Deterrents (Past 1 Month): Deterrents definitely stopped you from attempting suicide  Suicidal Behavior  Actual Attempt (Lifetime): No  Has subject engaged in non-suicidal self-injurious behavior? (Lifetime): No  Interrupted Attempts (Lifetime): No  Aborted or Self-Interrupted Attempt (Lifetime): No  Preparatory Acts or Behavior (Lifetime): No  C-SSRS Risk (Lifetime/Recent)  Calculated C-SSRS Risk Score (Lifetime/Recent): Low Risk    [place] Suicide Severity Rating Scale Since Last Contact: 2/24/23  Suicidal Ideation (Since Last Contact)  1. Wish to be Dead (Since Last Contact): No  Wish to be Dead Description  (Since Last Contact): continues to have thoughts that perhaps he does't deserve to be alive and all would be better if he were dead  2. Non-Specific Active Suicidal Thoughts (Since Last Contact): No  Suicidal Behavior (Since Last Contact)  Actual Attempt (Since Last Contact): No  Has subject engaged in non-suicidal self-injurious behavior? (Since Last Contact): No  Interrupted Attempts (Since Last Contact): No  Aborted or Self-Interrupted Attempt (Since Last Contact): No  Preparatory Acts or Behavior (Since Last Contact): No  Suicide (Since Last Contact): No     C-SSRS Risk (Since Last Contact)  Calculated C-SSRS Risk Score (Since Last Contact): No Risk Indicated    Environmental or Psychosocial Events: loss of relationship due to divorce/separation, work or task failure, challenging interpersonal relationships, helplessness/hopelessness, unemployment/underemployment, other life stressors and neither working nor attending school  Chronic Risk Factors: history of psychiatric hospitalization, chronic and ongoing sleep difficulties and serious, persistent mental illness   Warning Signs: talking or writing about death, dying, or suicide, hopelessness, feeling trapped, like there is no way out, increasing substance use or abuse and anxiety, agitation, unable to sleep, sleeping all the time  Protective Factors: strong bond to family unit, community support, or employment, lives in a responsibly safe and stable environment, good treatment engagement, help seeking, sense of self-efficacy and/or positive self-esteem and cultural, spiritual , or Adventist beliefs associated with meaning and value in life  Interpretation of Risk Scoring, Risk Mitigation Interventions and Safety Plan:  Pt endorsed some passive SI upon arrival, however, denied SI or wishes to be dead to writer. Pt is currently at low risk of harm to self.           Does the patient have thoughts of harming others? No    Mental Status Exam   Affect: Appropriate    Appearance: Appropriate    Attention Span/Concentration: Attentive?    Eye Contact: Engaged   Fund of Knowledge: Appropriate    Language /Speech Content: Fluent   Language /Speech Volume: Normal    Language /Speech Rate/Productions: Normal    Recent Memory: Intact   Remote Memory: Intact   Mood: Apathetic and Depressed    Orientation to Person: Yes    Orientation to Place: Yes   Orientation to Time of Day: Yes    Orientation to Date: Yes    Situation (Do they understand why they are here?): Yes    Psychomotor Behavior: Normal    Thought Content: Clear   Thought Form: Goal Directed and Intact       Additional Collateral Information   No additional collateral contacted at this time.      Therapeutic Intervention  The following therapeutic methodologies were employed when working with the patient: Establishing rapport, Active listening, Assess dimensions of crisis, Establish a discharge plan and Trauma-Informed Care. Patient response to intervention: cooperative and engaged.    Diagnosis:   311 (F32.9) Unspecified Depressive Disorder    300.00 (F41.9) Unspecified Anxiety Disorder - by history     Clinical Substantiation of Recommendations  Writer currently denies SI, denies HI, denies A/V H, no evidence of acute psychosis. Pt expresses anxiety has decreased while in the ED and pt feels he could engage in outpatient therapy and psychiatry upon discharge. Pt currently presents with low risk of harm to self or others, and symptoms may be effectively managed with outpatient services.     Pt expresses if things get worse or he feels unsafe he can reach out to his wife, brother, or friends. Pt reports he plans to discharge to his brother's house and stay there a couple days before returning to sober house.     Pt reports he typically [facility] in [place]. Pt will call his brother to pick him up.     Pt agreeable to plan for referral to assessment center for  for programmatic care. Pt interested in   IOP.    Plan:    Disposition  Recommended disposition: Individual Therapy and Medication Management; Assessment for programmatic care: consider IOP    Reviewed case and recommendations with attending provider. Attending Name: Jin Baugh MD      Attending concurs with disposition: Yes      Patient concurs with disposition: Yes      Final disposition: Individual Therapy and Medication Management; Assessment for programmatic care: consider IOP         Assessment Details  Total duration spent on the patient case in minutes: .25 hrs     CPT code(s) utilized: 76018 - Psychotherapy for Crisis (Each additional 30 minutes) - 30 min        Abdiaziz Courtney, CLE[place], [facility]  Callback: [phone]      Aftercare Plan    1-follow up with outpatient psychiatry provider within 1 week.   2-follow up with previously established outpatient mental health therapist.  3-follow up with Assessment Center for diagnostic assessment for programmatic care.      If I am feeling unsafe or I am in a crisis, I will:   Contact my established care providers   Call the National Suicide Prevention Lifeline: 988  Go to the nearest emergency room   Call 911     Warning signs that I or other people might notice when a crisis is developing for me: increase in intensity of depression and anxiety, catatonia, inability to start or complete daily tasks, change in thought process, thoughts of harming self or others.     Things I am able to do on my own to cope or help me feel better:   Grounding Techniques:    Try to notice where you are, your surroundings including the people, the sounds like the TV or radio.    Concentrate on your breathing. Take a deep cleansing breath from your diaphragm. Count the breaths as you exhale. Make sure you breath slowly.    Hold something that you find comforting, for some it may be a stuffed animal or a blanket. Notice how it feels in your hands. Is it hard or soft?    During a non-crisis time make a list of positive  affirmations. Print them out and keep them handy for times of intense anxiety. At those times, read them aloud.  Try the 73250 game:    Name 5 things you can see in the room with you    Name 4 things you can feel ( chair on my back  or  feet on floor )     Name 3 things you can hear right now ( people talking  or  tv )     Name 2 things you can smell right now (or, 2 things you like the smell of)     Name 1 good thing about yourself  Create A Safe Place    Image a safe place -- it can be a real or imaginary place:     What do you see -- especially colors?     What sounds do you hear?     What sensations do you feel?     What smells do you smell?     What people or animals would you want in your safe place?     Imagine a protective bubble, wall or boundary around your safe place.     Imagine a door or gate with a guard at your safe place.     Image a lock and key to your safe place and only you can unlock it.    You can draw or make a collage that represents your safe place.     Choose a souvenir of your safe place -- a color, an object, a song.     Keep your image of your safe place so you can come back to it when you need to.    Things that I am able to do with others to cope or help me better: do an activity I enjoy with a family member or friend, attend an AA meeting    Things I can use or do for distraction:  go for a walk, call someone, go to an AA meeting       Changes I can make to support my mental health and wellness: maintain a regular eating and sleeping schedule, continue to follow up with outpatient therapy and medication management providers, engage in activities I enjoy.      People in my life that I can ask for help: brother, wife, friends, AA support groups, therapist, psychiatrist    [facility] has a mental health crisis team you can call 24/7: [facility] Mobile Crisis  [phone]     Additional resources and information: Substance Use Disorder Direct Access Resources    It is recommended  that you abstain from all mood-altering chemicals. Please contact the sober support hotline (864-324-9765) as needed; phones are answered 24 hours a day, 7 days a week.     To access substance use treatment you must have a comprehensive assessment completed to begin any treatment program.     If uninsured, please contact your county of residence for eligibility screen for substance use disorder evaluation and treatment.     [place] - [phone]    [place]  [phone]    [facility] [phone]    [facility] [phone]    [facility] [phone]    [facility] [phone]    [facility] [phone]    [place] - [phone]    If you have private insurance, call the customer service number on the back of your insurance card to find an in-network substance use disorder assessment. The ideal provider will be a treatment facility, licensed in the [facility].     The following facilities also offer Direct Access Substance Use Disorder assessments:      [facility]  [phone]  Mon-Friday: [address] [facility], [address]  Saturday:  [address], [place], [address]  M-F assessments (7a-1:45p); Saturday assessments (7:45-10:45a)      [facility]  [phone]  [address] [place], [place], [address]  *by appointment only M-W; walk ins available Fridays from 10-2.      [facility]  [phone] (phone consultation available 24/7)  In-person Assessments: [address] [facility], [address], [place] [address] [address] [place] [address]  [address] [place] [address]  [address] [place] [address]      [facility]  [phone]  [address] [facility], [place], [address]  *walk in and appointments available by calling      [facility]  [phone] [address] [place], [place], [address]  *by appointment only M-Th       [facility]  [phone]  [address] [place] [address]  [place], [place], [address]  *walk ins available M-F      [facility]  [phone]  [address] [facility], [place], [address]  *available by appointments only      [facility]  [phone]  [address] [place], [address]  *available by appointment only      [facility] ([facility])  [phone]  [address], [place], [place], [address]  *Walk in assessments also available M-F starting at 8 am.      [facility]  [phone]  [address] [place], [place], [address]  *available by appointments      [facility]  [phone]  [address] [place], [place], [address]  *walk in assessments available M-F starting at 7 am.      [facility]  [phone]  [facility]  [address], [place], [place], [address]  *Walk in assessments available M-F starting at 8 am OR [phone]      [facility]  [address], [place] [address]  *Walk in assessments available M-F starting at 8 am      [facility]  [phone]  [address] [place] [address]      [facility]  [phone]  [address] [place], [place], [address]  *available by appointment only      [facility]  [phone]  [address] [facility] [address] [facility] [address], [place], [address]      [facility]  [phone]  [address], [place], [place], [address]  *available by appointment      [facility]  [phone]  [address], [place], [place], [address]  *walk ins available M-TH from 9-3      [facility]  [phone]  [address], [place], [place], [address]      [place]  [phone] [address] [place] [address]  [address] [address] [address]      [facility]  [email or web address]  [phone]  [address], [address], [place] [address]      [facility]   [email or web address]  [phone], [address], [place], [place] [address] [phone], [address], [place] [address]  [phone], [address] [facility] [address] [facility] [address], [address], [place] [address]  [phone], [address], ([facility]), [place] [address]  [phone], [address] [address] [place] [address]    If you are intoxicated, you may be required to detox at a detox facility before starting treatment. The following are detox facilities where you may seek services:      [facility]: [address] [place], [place], [id number] [phone]    [facility]: [address] [place], [place], [address]  [phone]    [facility]: [address] [place], [place], [id number] [phone]    [facility]: [address] [place], [place], [address] [phone]    [facility]: [address] [place], [place], [address] [phone]     Ways to help cope with sobriety:    Take prescribed medicines as scheduled    Keep follow-up appointments    Talk to others about your concerns    Get regular exercise    Practice deep breathing skills    Eat a healthy diet    Use community resources, including hotline numbers, [facility] crisis and support meetings    Stay sober and avoid places/people/things associated with substance use    Maintain a daily schedule/routine    Get at least 7-8 hours of sleep per night    Create a list 10--20 healthy activities that you can do that are enjoyable and do not involve substance use    Create daily goals (approx. 1-4 goals) per day and work to achieve them throughout the day    [place] [facility] (MRC): www.[place]arecovery.org  898.173.2948  [facility] connects people seeking recovery to resources that help foster and sustain long-term recovery. Whether you are seeking resources for treatment, transportation,  housing, job training, education, health care or other pathways to recovery, [facility] [place] [facility] is a great place to start. (Great listing of all types of recovery and non-recovery related resources)    [facility]: https://www.smartrecovery.org/    Alcoholics Anonymous: 1-800-ALCOHOL  HTTP://WWW.AA.ORG/  AA [place] (952-004-2411 or http://a[place].org)  AA [place] (200-798-9422 or www.aastpaul.org)    Narcotics Anonymous: 639.750.9727  www.naminSweet [employer].us.    [facility]: [address] [place] [address], [address], [place], [place]  [phone]  Drop-in Hours: Monday-Friday 9-11:30 am. By appointment at other times.  [facility] is a drop-in center on the east side [facility] that provides a safe space for individuals who are homeless and have a history of chemical use. Sobriety is not a requirement but drugs and alcohol are not allowed on the property.  Non-clients can access drop-in services such as Recovery and Harm Reduction Groups, referrals to case management, community activities, shower facilities, and a pool table. Individuals who are homeless and have chemical health needs may be eligible for enrollment into [facility]'s case management program. Clients and  work together to access benefits, treatment, health care, shelter, and external housing resources.     [facility] Chemical Assessment & Referral Unit: [address] [place] [address]  [phone]  Monday-Friday 8 am-5 pm  Substance use disorder comprehensive assessment and referral for individuals seeking treatment or counseling for chemical dependency. Must be a resident of [facility]. There is no fee for assessment. There is some funding available for treatment programs.    [facility]: [facility]  [phone] or [phone]  Monday - Friday 7 am - 5 pm  Daily drop-in substance use disorder comprehensive assessment and weekly mental health assessments and services. Outpatient chemical  "dependency treatment (with sober recovery housing), relapse prevention, case management, and employment services. Outpatient and residential treatment for women with children. Specializes in assisting individuals with a history of relapse who face multiple barriers to achieving stable recovery. No charge for most services or services can be billed through insurance. Gender-specific services and treatment for co-occurring substance abuse and mental health concerns offered.    [facility]: [facility], [facility] [facility], First Floor, [address], [place] [address] (next to the outpatient lab)  [phone]  Serves people ages 16 and older  Open 5 days/week Walk-in hours: Monday, Wednesday, Friday 9:00 am - 11:00 am and 1:00 pm - 3:00 pm and Tuesday, Thursday 1:30 pm - 4:00 pm  Provides bridging services to people with Opiate Use Disorders (OUD) seeking care. This is a front door to Medication Assisted Treatments (MAT), Peer Recovery and Nursing services and referrals to Substance Use Disorder (JACKY) Assessments.      Crisis Lines  Crisis Text Line  Text 253562  You will be connected with a trained live crisis counselor to provide support.    Por espanol, texto  GARY a 386820 o texto a 442-AYUDAME en WhatsApp    The Kalpesh Project (LGBTQ Youth Crisis Line)  7.784.421.7111  text START to 261-735      Community Resources  Fast Tracker  Linking people to mental health and substance use disorder resources  fasttrackermn.org     [place] [facility] Warm Line  Peer to peer support  Monday thru Saturday, 12 pm to 10 pm  132.269.4478 or 0.106.603.2386  Text \"Support\" to 94040    National [place] on Mental Illness (JEWEL)  980.375.0247 or 1.888.JEWLE.HELPS      Mental Health Apps  My3  https://myPulpWorkspp.org/    VirtualHopeBox  https://Nu-Pulse.org/apps/virtual-hope-box/      Additional Information  Today you were seen by a licensed mental health professional through Triage " and Transition services, [facility] Healthcare Providers ([facility])  for a crisis assessment in the Emergency Department at [facility].  It is recommended that you follow up with your established providers (psychiatrist, mental health therapist, and/or primary care doctor - as relevant) as soon as possible. Coordinators from [facility] will be calling you in the next 24-48 hours to ensure that you have the resources you need.  You can also contact [facility] coordinators directly at [phone]. You may have been scheduled for or offered an appointment with a mental health provider. [facility] maintains an extensive network of licensed behavioral health providers to connect patients with the services they need.  We do not charge providers a fee to participate in our referral network.  We match patients with providers based on a patient's specific needs, insurance coverage, and location.  Our first effort will be to refer you to a provider within your care system, and will utilize providers outside your care system as needed.

## 2023-02-27 ASSESSMENT — ANXIETY QUESTIONNAIRES
IF YOU CHECKED OFF ANY PROBLEMS ON THIS QUESTIONNAIRE, HOW DIFFICULT HAVE THESE PROBLEMS MADE IT FOR YOU TO DO YOUR WORK, TAKE CARE OF THINGS AT HOME, OR GET ALONG WITH OTHER PEOPLE: VERY DIFFICULT
1. FEELING NERVOUS, ANXIOUS, OR ON EDGE: NEARLY EVERY DAY
6. BECOMING EASILY ANNOYED OR IRRITABLE: NOT AT ALL
8. IF YOU CHECKED OFF ANY PROBLEMS, HOW DIFFICULT HAVE THESE MADE IT FOR YOU TO DO YOUR WORK, TAKE CARE OF THINGS AT HOME, OR GET ALONG WITH OTHER PEOPLE?: VERY DIFFICULT
5. BEING SO RESTLESS THAT IT IS HARD TO SIT STILL: NOT AT ALL
GAD7 TOTAL SCORE: 14
3. WORRYING TOO MUCH ABOUT DIFFERENT THINGS: NEARLY EVERY DAY
7. FEELING AFRAID AS IF SOMETHING AWFUL MIGHT HAPPEN: MORE THAN HALF THE DAYS
4. TROUBLE RELAXING: NEARLY EVERY DAY
7. FEELING AFRAID AS IF SOMETHING AWFUL MIGHT HAPPEN: MORE THAN HALF THE DAYS
GAD7 TOTAL SCORE: 14
GAD7 TOTAL SCORE: 14
2. NOT BEING ABLE TO STOP OR CONTROL WORRYING: NEARLY EVERY DAY

## 2023-02-27 ASSESSMENT — PATIENT HEALTH QUESTIONNAIRE - PHQ9
10. IF YOU CHECKED OFF ANY PROBLEMS, HOW DIFFICULT HAVE THESE PROBLEMS MADE IT FOR YOU TO DO YOUR WORK, TAKE CARE OF THINGS AT HOME, OR GET ALONG WITH OTHER PEOPLE: EXTREMELY DIFFICULT
SUM OF ALL RESPONSES TO PHQ QUESTIONS 1-9: 18
SUM OF ALL RESPONSES TO PHQ QUESTIONS 1-9: 18

## 2023-02-28 ENCOUNTER — HOSPITAL ENCOUNTER (OUTPATIENT)
Dept: BEHAVIORAL HEALTH | Facility: CLINIC | Age: 53
Discharge: HOME | End: 2023-02-28
Attending: FAMILY MEDICINE | Admitting: FAMILY MEDICINE
Payer: COMMERCIAL

## 2023-02-28 PROCEDURE — 90791 PSYCH DIAGNOSTIC EVALUATION: CPT | Mod: GT,95 | Performed by: COUNSELOR

## 2023-02-28 ASSESSMENT — COLUMBIA-SUICIDE SEVERITY RATING SCALE - C-SSRS
1. SINCE LAST CONTACT, HAVE YOU WISHED YOU WERE DEAD OR WISHED YOU COULD GO TO SLEEP AND NOT WAKE UP?: NO
SUICIDE, SINCE LAST CONTACT: NO
2. HAVE YOU ACTUALLY HAD ANY THOUGHTS OF KILLING YOURSELF?: NO
ATTEMPT SINCE LAST CONTACT: NO
6. HAVE YOU EVER DONE ANYTHING, STARTED TO DO ANYTHING, OR PREPARED TO DO ANYTHING TO END YOUR LIFE?: NO
TOTAL  NUMBER OF INTERRUPTED ATTEMPTS SINCE LAST CONTACT: NO
TOTAL  NUMBER OF ABORTED OR SELF INTERRUPTED ATTEMPTS SINCE LAST CONTACT: NO

## 2023-02-28 NOTE — PROGRESS NOTES
"    [facility]      PATIENT'S NAME: Jorge Diana  PREFERRED NAME: Jorge  PRONOUNS: he/him/his     MRN: [id number]  : 1970  ADDRESS: [address]  [facility] [address]  ACCT. NUMBER:  [id number]  DATE OF SERVICE: 23  START TIME: 1100  END TIME: 1230  PREFERRED PHONE: [phone]  May we leave a program related message: Yes  SERVICE MODALITY:  Video Visit:      Provider verified identity through the following two step process.  Patient provided:  Patient  and Patient address    Telemedicine Visit: The patient's condition can be safely assessed and treated via synchronous audio and visual telemedicine encounter.      Reason for Telemedicine Visit: Services only offered telehealth    Originating Site (Patient Location): Patient's home    Distant Site (Provider Location): Provider Remote Setting- Home Office    Consent:  The patient/guardian has verbally consented to: the potential risks and benefits of telemedicine (video visit) versus in person care; bill my insurance or make self-payment for services provided; and responsibility for payment of non-covered services.     Patient would like the video invitation sent by:  My Chart    Mode of Communication:  Video Conference via [employer]    Distant Location (Provider):  Off-site    As the provider I attest to compliance with applicable laws and regulations related to telemedicine.    UNIVERSAL ADULT Mental Health DIAGNOSTIC ASSESSMENT    Identifying Information:  Patient is a 52 year old,   individual.  Patient was referred for an assessment by [facility].  Patient attended the session alone.    Chief Complaint:   The reason for seeking services at this time is: \"Depression\".  The problem(s) began 19.    Patient has attempted to resolve these concerns in the past through therapy.    Social/Family History:  Patient reported they grew up in other [place], [place].  They were " "raised by biological parents  .  Parents were always together.  Patient reported that their childhood was \"went on vacations every year\".  Patient described their current relationships with family of origin as \"I call them, though I would say strained\".     The patient describes their cultural background as .  Cultural influences and impact on patient's life structure, values, norms, and healthcare: Hoahaoism.  Contextual influences on patient's health include: Contextual Factors: Individual Factors relationship conflict.    These factors will be addressed in the Preliminary Treatment plan. Patient identified their preferred language to be English. Patient reported they does not need the assistance of an  or other support involved in therapy.     Patient reported had no significant delays in developmental tasks.   Patient's highest education level was associate degree / vocational certificate  .  Patient identified the following learning problems: reading.  Modifications will not be used to assist communication in therapy.  Patient reports they are  able to understand written materials.    Patient reported the following relationship history:  Patient is currently .  Patient's current relationship status is  for 28 years.   Patient identified their sexual orientation as heterosexual.  Patient reported having 2 child(palma). Patient identified parents; mother; father; siblings; adult child; friends; therapist; spouse; other as part of their support system.  Patient identified the quality of these relationships as fair,  .      Patient's current living/housing situation involves staying with someone in sober housing.  Patient reports living in sober housing and they report that housing is stable.    Patient is currently unemployed.  Patient reports their finances are obtained through spouse. Patient does identify finances as a current stressor.      Patient reported that they have not " been involved with the legal system.  Patient does not report being under probation/ parole/ jurisdiction. They are not under any current court jurisdiction. .    Patient's Strengths and Limitations:  Patient identified the following strengths or resources that will help them succeed in treatment: commitment to health and well being. Things that may interfere with the patient's success in treatment include: none identified.     Assessments:  The following assessments were completed by patient for this visit:  PHQ9:   PHQ-9 SCORE 2/27/2023   PHQ-9 Total Score MyChart 18 (Moderately severe depression)   PHQ-9 Total Score 18     GAD7:   HAIDER-7 SCORE 2/27/2023   Total Score 14 (moderate anxiety)   Total Score 14   Answers for HPI/ROS submitted by the patient on 2/27/2023  If you checked off any problems, how difficult have these problems made it for you to do your work, take care of things at home, or get along with other people?: Extremely difficult  PHQ9 TOTAL SCORE: 18  HAIDER 7 TOTAL SCORE: 14  CAGE-AID:   CAGE-AID Total Score 2/27/2023   Total Score 3   Total Score MyChart 3 (A total score of 2 or greater is considered clinically significant)     PROMIS 10-Global Health (only subscores and total score):   PROMIS-10 Scores Only 2/27/2023   Global Mental Health Score 5   Global Physical Health Score 15   PROMIS TOTAL - SUBSCORES 20     [place] Suicide Severity Rating Scale (Lifetime/Recent)  [place] Suicide Severity Rating (Lifetime/Recent) 1/27/2019 1/27/2019 1/28/2019 1/28/2019 1/28/2019 5/6/2019 2/19/2023   Non-Specific Active Suicidal Thoughts (Lifetime) - - - - - - -   Q1 Wished to be Dead (Past Month) - - - - - no no   Q2 Suicidal Thoughts (Past Month) - - - - - no no   Q3 Suicidal Thought Method - - - - - - no   Q4 Suicidal Intent without Specific Plan - - - - - - no   Q5 Suicide Intent with Specific Plan - - - - - - no   Q6 Suicide Behavior (Lifetime) - - - - - no no   Level of Risk per Screen - - - - - - low  risk   RETIRED: 1. Wish to be Dead (Recent) No No No No No - -   RETIRED: 2. Non-Specific Active Suicidal Thoughts (Recent) No No No No No - -   3. Active Suicidal Ideation with any Methods (Not Plan) Without Intent to Act (Lifetime) - - - - - - -   RETIRED: 3. Active Suicidal Ideation with any Methods (Not Plan) Without Intent to Act (Recent) - - No No No - -   RETIRE: 4. Active Suicidal Ideation with Some Intent to Act, Without Specific Plan (Lifetime) - - - - - - -   4. Active Suicidal Ideation with Some Intent to Act, Without Specific Plan (Recent) - - No No No - -   RETIRE: 5. Active Suicidal Ideation with Specific Plan and Intent (Lifetime) - - - - - - -   RETIRED: 5. Active Suicidal Ideation with Specific Plan and Intent (Recent) - - No No No - -   1. Wish to be Dead (Lifetime) - - - - - - 1   1. Wish to be Dead (Past 1 Month) - - - - - - 1   2. Non-Specific Active Suicidal Thoughts (Lifetime) - - - - - - 1   2. Non-Specific Active Suicidal Thoughts (Past 1 Month) - - - - - - 0   3. Active Suicidal Ideation with any Methods (Not Plan) Without Intent to Act (Lifetime) - - - - - - 1   3. Active Suicidal Ideation with any Methods (Not Plan) Without Intent to Act (Past 1 Month) - - - - - - 0   4. Active Suicidal Ideation with Some Intent to Act, Without Specific Plan (Lifetime) - - - - - - 0   5. Active Suicidal Ideation with Specific Plan and Intent (Lifetime) - - - - - - 0   Most Severe Ideation Rating (Lifetime) - - - - - - 1   Most Severe Ideation Rating (Past 1 Month) - - - - - - 1   Frequency (Lifetime) - - - - - - 1   Frequency (Past 1 Month) - - - - - - 1   Duration (Lifetime) - - - - - - 2   Duration (Past 1 Month) - - - - - - 2   Controllability (Lifetime) - - - - - - 1   Controllability (Past 1 Month) - - - - - - 1   Deterrents (Lifetime) - - - - - - 1   Deterrents (Past 1 Month) - - - - - - 1   Actual Attempt (Lifetime) - - - - - - 0   Has subject engaged in non-suicidal self-injurious behavior?  (Lifetime) - - - - - - 0   Interrupted Attempts (Lifetime) - - - - - - 0   Aborted or Self-Interrupted Attempt (Lifetime) - - - - - - 0   Preparatory Acts or Behavior (Lifetime) - - - - - - 0   Calculated C-SSRS Risk Score (Lifetime/Recent) - - - - - - Low Risk     [place] Suicide Severity Rating Scale (Short Version)  [place] Suicide Severity Rating (Short Version) 1/18/2019 5/6/2019 2/19/2023 2/21/2023 2/22/2023 2/24/2023 2/28/2023   Over the past 2 weeks have you felt down, depressed, or hopeless? - - yes - - - -   Over the past 2 weeks have you had thoughts of killing yourself? - - yes - - - -   Have you ever attempted to kill yourself? - - no - - - -   Q1 Wished to be Dead (Past Month) no no no - - - -   Comments per triage Rn pt has statee he has had thoughts only , no plan . wife and pt stated depression .  - - - - - -   Q2 Suicidal Thoughts (Past Month) yes no no - - - -   Comments pt has had thoughts only , no intent .  - - - - - -   Q3 Suicidal Thought Method - - no - - - -   Q4 Suicidal Intent without Specific Plan yes - no - - - -   Q5 Suicide Intent with Specific Plan no - no - - - -   Q6 Suicide Behavior (Lifetime) no no no - - - -   Level of Risk per Screen - - low risk - - - -   Required Interventions - - Room searched;Room made safe;Patient searched;Belongings removed - - - -   Interventions - - DEC consulted;Monitored via video - - - -   1. Wish to be Dead (Since Last Contact) - - - 1 1 0 0   Wish to be Dead Description (Since Last Contact) - - - at times feels he's not worth being alive continues to have thoughts that perhaps he does't deserve to be alive and all would be better if he were dead - -   2. Non-Specific Active Suicidal Thoughts (Since Last Contact) - - - 0 0 0 0   Most Severe Ideation Rating (Since Last Contact) - - - 1 1 - -   Frequency (Since Last Contact) - - - 4 4 - -   Duration (Since Last Contact) - - - 2 2 - -   Deterrents (Since Last Contact) - - - 1 1 - -   Reasons for  Ideation (Since Last Contact) - - - 4 4 - -   Actual Attempt (Since Last Contact) - - - 0 0 0 0   Has subject engaged in non-suicidal self-injurious behavior? (Since Last Contact) - - - 0 0 0 0   Interrupted Attempts (Since Last Contact) - - - 0 0 0 0   Aborted or Self-Interrupted Attempt (Since Last Contact) - - - 0 0 0 0   Preparatory Acts or Behavior (Since Last Contact) - - - 0 0 0 0   Suicide (Since Last Contact) - - - 0 0 0 0   Calculated C-SSRS Risk Score (Since Last Contact) - - - Low Risk Low Risk No Risk Indicated No Risk Indicated       Personal and Family Medical History:  Patient does report a family history of mental health concerns.  Patient reports family history includes Substance Abuse in his maternal grandmother..     Patient does report Mental Health Diagnosis and/or Treatment.  Patient Patient reported the following previous diagnoses which include(s): ADHD; an anxiety disorder; depression .  Patient reported symptoms began in 2019.  Patient has received mental health services in the past:  therapy  .  Psychiatric Hospitalizations: [facility]; [facility] when  January, 2019, February, 2023 . Patient denies a history of civil commitment.  Currently, patient is  receiving other mental health services.  These include therapy with [facility] with [facility].        Patient has had a physical exam to rule out medical causes for current symptoms.  Date of last physical exam was within the past year. Client was encouraged to follow up with PCP if symptoms were to develop. The patient has a non-[place] Primary Care Provider. Their PCP is [facility]..  Patient reports the following current medical concerns: ringing in the ear.  Patient reports pain concerns including knee pain.  Patient does not want help addressing pain concerns..   There are not significant appetite / nutritional concerns / weight changes.   Patient does  report a history of head injury / trauma / cognitive impairment.  Patient reports a few concussions with last occurrence between 5 and 8 years ago.    Patient reports current meds as:   Outpatient Medications Marked as Taking for the 2/28/23 encounter (Hospital Encounter) [facility]   Medication Sig     atomoxetine (STRATTERA) 80 MG capsule Take 80 mg by mouth daily     FLUoxetine (PROZAC) 20 MG capsule Take 1 capsule (20 mg) by mouth daily     lamoTRIgine (LAMICTAL) 100 MG tablet Take 2 tablets by mouth daily at 2 pm     Vitamin D3 (CHOLECALCIFEROL) 125 MCG (5000 UT) tablet Take by mouth daily       Medication Adherence:  Patient reports taking.  taking prescribed medications as prescribed.    Patient Allergies:    Allergies   Allergen Reactions     Penicillins Itching and Rash       Medical History:    Past Medical History:   Diagnosis Date     Depressive disorder      Fatigue      Inguinal hernia without mention of obstruction or gangrene, unilateral or unspecified, (not specified as recurrent)          Current Mental Status Exam:   Appearance:  Appropriate    Eye Contact:  Good   Psychomotor:  Normal       Gait / station:  no problem  Attitude / Demeanor: Cooperative  Interested  Speech      Rate / Production: Normal/ Responsive      Volume:  Normal  volume      Language:  intact and no problems  Mood:   Normal  Affect:   Appropriate    Thought Content: Clear   Thought Process: Logical       Associations: No loosening of associations  Insight:   Good   Judgment:  Intact   Orientation:  All  Attention/concentration: Good      Substance Use:  Patient did report a family history of substance use concerns; see medical history section for details.  Patient has received chemical dependency treatment in the past at [facility] during COVID.  Patient has not ever been to detox.      Patient is not currently receiving any chemical dependency treatment. Patient reports going to   occasionally.          Substance History of use Age of first use Date of last use     Pattern and duration of use (include amounts and frequency)   Alcohol used in the past   16 12/01/22 REPORTS SUBSTANCE USE: reports using substance 1 times per 3 months and has 1 beer at a time.   Patient reports heaviest use is current use.   Cannabis   used in the past 14 01/06/23 REPORTS SUBSTANCE USE: reports using substance 1 times per week and has 1 smoke at a time.   Patient reports heaviest use was when patient was using daily.     Amphetamines   used in the past   10/27/93 REPORTS SUBSTANCE USE: N/A   Cocaine/crack    used in the past 23  10/27/93  REPORTS SUBSTANCE USE: N/A   Hallucinogens used in the past   21  09/27/96  REPORTS SUBSTANCE USE: N/A   Inhalants never used         REPORTS SUBSTANCE USE: N/A   Heroin never used         REPORTS SUBSTANCE USE: N/A   Other Opiates never used     REPORTS SUBSTANCE USE: N/A   Benzodiazepine - prescription - ativan while in the hospital and left with a prescription for ativan   used in the past 49 02/26/23 REPORTS SUBSTANCE USE: N/A   Barbiturates never used     REPORTS SUBSTANCE USE: N/A   Over the counter meds never used     REPORTS SUBSTANCE USE: N/A   Caffeine currently use 16  2/28/23 REPORTS SUBSTANCE USE: reports using substance 1 times per week and has 1 coffee at a time.   Patient reports heaviest use was daily use.   Nicotine  currently use 15 02/28/23 REPORTS SUBSTANCE USE: reports using substance 10 times per day and has 1 cigarette at a time.   Patient reports heaviest use is current use.   Other substances not listed above:  Identify:  never used     REPORTS SUBSTANCE USE: N/A     Patient reported the following problems as a result of their substance use: family problems; occupational/vocational problems.    Substance Use: substance related decrease in work performance and cravings/urges to use    Based on the positive CAGE score and clinical interview there  are  indications of drug or alcohol abuse. Patient reports they have established sobriety, is currently participating in [employer] and currently lives in sober housing.      Significant Losses / Trauma / Abuse / Neglect Issues:   Patient did not  serve in the .  There are indications or report of significant loss, trauma, abuse or neglect issues related to: Patient reports two incidents of traumatic experiences.  Concerns for possible neglect are not present.     Safety Assessment:   Patient denies current homicidal ideation and behaviors.  Patient denies current self-injurious ideation and behaviors.    Patient reported unsafe motor vehicle operation associated with substance use.  Patient denies any high risk behaviors associated with mental health symptoms.  Patient reports the following current concerns for their personal safety: None.  Patient reports there are not firearms in the house.       There are no firearms in the home..    History of Safety Concerns:  Patient denied a history of homicidal ideation.     Patient denied a history of personal safety concerns.    Patient denied a history of assaultive behaviors.    Patient denied a history of sexual assault behaviors.     Patient reported a history unsafe motor vehicle operation associated with substance use.  Patient denies any history of high risk behaviors associated with mental health symptoms.  Patient reports the following protective factors: dedication to family or friends; adherence with prescribed medication; sense of meaning; healthy fear of risky behaviors or pain; sense of personal control or determination    Risk Plan:  See Recommendations for Safety and Risk Management Plan    Review of Symptoms per patient report:   Depression: Change in sleep, Lack of interest, Excessive or inappropriate guilt, Change in energy level, Difficulties concentrating, Change in appetite, Feelings of hopelessness, Low self-worth, Ruminations, Irritability and Feeling  sad, down, or depressed  Belinda:  No Symptoms  Psychosis: No Symptoms  Anxiety: Excessive worry, Nervousness, Physical complaints, such as headaches, stomachaches, muscle tension, Sleep disturbance, Ruminations, Poor concentration and Irritability  Panic:  Palpitations, Tremors, Shortness of breath and Sense of impending doom  Post Traumatic Stress Disorder:  Reexperiencing of trauma   Eating Disorder: No Symptoms  ADD / ADHD:  Inattentive, Difficulties listening, Poor task completion and Poor organizational skills  Conduct Disorder: No symptoms  Autism Spectrum Disorder: No symptoms  Obsessive Compulsive Disorder: No Symptoms    Patient reports the following compulsive behaviors and treatment history: Patients.      Diagnostic Criteria:   Generalized Anxiety Disorder  A. Excessive anxiety and worry about a number of events or activities (such as work or school performance).   B. The person finds it difficult to control the worry.  C. Select 3 or more symptoms (required for diagnosis). Only one item is required in children.   - Restlessness or feeling keyed up or on edge.    - Being easily fatigued.    - Difficulty concentrating or mind going blank.    - Irritability.    - Muscle tension.    - Sleep disturbance (difficulty falling or staying asleep, or restless unsatisfying sleep).   D. The focus of the anxiety and worry is not confined to features of an Axis I disorder.  E. The anxiety, worry, or physical symptoms cause clinically significant distress or impairment in social, occupational, or other important areas of functioning.   F. The disturbance is not due to the direct physiological effects of a substance (e.g., a drug of abuse, a medication) or a general medical condition (e.g., hyperthyroidism) and does not occur exclusively during a Mood Disorder, a Psychotic Disorder, or a Pervasive Developmental Disorder. Major Depressive Disorder  CRITERIA (A-C) REPRESENT A MAJOR DEPRESSIVE EPISODE - SELECT THESE  CRITERIA  A) Recurrent episode(s) - symptoms have been present during the same 2-week period and represent a change from previous functioning 5 or more symptoms (required for diagnosis)   - Depressed mood. Note: In children and adolescents, can be irritable mood.     - Diminished interest or pleasure in all, or almost all, activities.    - Fatigue or loss of energy.    - Feelings of worthlessness or inappropriate guilt.    - Diminished ability to think or concentrate, or indecisiveness.   B) The symptoms cause clinically significant distress or impairment in social, occupational, or other important areas of functioning  C) The episode is not attributable to the physiological effects of a substance or to another medical condition  D) The occurence of major depressive episode is not better explained by other thought / psychotic disorders  E) There has never been a manic episode or hypomanic episode    Functional Status:  Patient reports the following functional impairments:  management of the household and or completion of tasks, relationship(s) and self-care.     Programmatic care:  Current LOCUS was assigned and patient needs the following level of care based on score 17  .    Clinical Summary:  1. Reason for assessment: to determine level of care  .  2. Psychosocial, Cultural and Contextual Factors: relationship conflict  .  3. Principal DSM5 Diagnoses  (Sustained by DSM5 Criteria Listed Above):   296.33 (F33.2) Major Depressive Disorder, Recurrent Episode, Severe _ and With anxious distress.  4. Other Diagnoses that is relevant to services:   300.02 (F41.1) Generalized Anxiety Disorder.  5. Provisional Diagnosis:  Further diagnosis clarification may be beneficial.  6. Prognosis: Expect Improvement.  7. Likely consequences of symptoms if not treated: higher level of care.  8. Client strengths include:  motivated, open to learning, wants to learn, willing to ask questions and willing to relate to others .      Recommendations:     1. Plan for Safety and Risk Management:   Safety and Risk: A safety and risk management plan has been developed including: When the [place] Suicide Severity Rating Scale has been completed, the patient identifies lifetime history of suicidal ideation and/or Suicidal Behavior that is greater than 10 years.      The recommendation is to provide the Brief Safety Plan:    Adult Short Safety Plan:   Name: Jorge Diana  YOB: 1970  Date: February 28, 2023   My primary care provider: [facility]  My prescriber: [facility]  Other care team support:  Therapist, Psychiatrist   My Triggers:  Relationship conflict .     Additional People, Places, and Things that I can access for support: Therapist, prescriber                KASSIDY    Good Control  1. I feel good  2. No suicidal thoughts   3. Can work, sleep and play      Action Steps  1. Self-care: balanced meals, exercising, sleep practices, etc.  2. Take your medications as prescribed.  3. Continue meetings with therapist and prescriber.  4.  Do the healthy things that I enjoy.             YELLOW  Getting Worse  I have ANY of these:  1. I do not feel good  2. Difficulty Concentrating  3. Sleep is changing  4. Increase/Change in my thoughts to hurt self and/or others, but I can still manage and not act on it.   5. Not taking care of self.               Action Steps (in addition to the above):  1. Inform your therapist and psychiatric prescriber/PCP.  2. Keep taking your medications as prescribed.    3. Turn to people you can ask for help.  4. Use internal coping strategies -see below.  5. Create safe environment: notify friends/family of increase in symptoms             RED  Get Help  If I have ANY of these:  1. Current and uncontrollable thoughts and/or behaviors to hurt self and/or others.      Actions to manage my safety  1. Contact your emergency person   2. Call or Text [phone]  3. Call my crisis team- [facility]  [phone] [facility] or [facility] [phone] [facility]  3. Or Call 911 or go to the emergency room right away          My Internal Coping Strategies include the following:  belly breathing and use my coping skills    Safety Concerns  How To Identify Situations That Make Your Mental Health Worse:  Triggers are things that make your mental health worse.  Look at the list below to help you find your triggers and what you can do about them.     1. Identify Early Warning Signs:    Sometimes symptoms return, even when people do their best to stay well. Symptoms can develop over a short period of time with little or no warning, but most of the time they emerge gradually over several weeks.  Early warning signs are changes that people experience when a relapse is starting. Some early warning signs are common and others are not as common.   Common Early Warning Signs:    Feeling tense or nervous     2. Identify action steps to take when warning signs are noticed:    Taking Action- It is important to take action if you are experiencing early warning signs of a relapse.  The faster you act, the more likely it is that you can avoid a full relapse.  It is helpful to identify several specific ways to cope with symptoms.      The following is my list of symptoms and coping strategies that I can use when they are present:    Symptom Coping Strategies   Anxiety -Talk with someone in your support system and let him or her know how you are feeling.  -Use relaxation techniques such as deep breathing or imagery.  -Use positive affirmations to counteract negative self-talk such as  I am learning to let go of worry.    Depression - Schedule your day; include activities you have to do and activities you enjoy doing.  - Get some exercise - walk, run, bike, or swim.  - Give yourself credit for even the smallest things you get done.   Sleep Difficulties   - Go to sleep at  the same time every day.  - Do something relaxing before bed, such as drinking herbal tea or listening to music.  - Avoid having discussions about upsetting topics before going to bed.   Delusions   - Distract yourself from the disturbing thought by doing something that requires your attention such as a puzzle.  - Check out your beliefs by talking to someone you trust.    Hallucinations   - Use headphones to listen to music.  - Tell voices to  stop  or say to yourself,  I am safe.   - Ignore the hallucinations as much as possible; focus on other things.   Concentration Difficulties - Minimize distractions so there is only one thing for you to focus on at a time.    - Ask the person you are having a conversation with to slow down or repeat things you are unsure of.           .  Patient consented to co-developed safety plan.  Safety and risk management plan was completed.  Patient agreed to use safety plan should any safety concerns arise.  A copy was given to the patient..          Report to child / adult protection services was NA.     2. Patient's identified none identified.     3. Initial Treatment will focus on:    Depressed Mood - .  Anxiety - ..     4. Resources/Service Plan:    services are not indicated.   Modifications to assist communication are not indicated.   Additional disability accommodations are not indicated.      5. Collaboration:   Collaboration / coordination of treatment will be initiated with the following  support professionals: outpatient therapist.      6.  Referrals:   The following referral(s) will be initiated: Outpatient Mental Health Therapy Group. Next Scheduled Appointment: Patient to follow up with  should patient become interested in programmatic care.      A Release of Information has been obtained for the following: Emergency Contact.     Emergency Contact Yamilex Diana was obtained.      Clinical Substantiation/medical necessity for the above recommendations:     Patient is a 52 year old male who presents with depression and anxiety concerns.  Patient reports history of Anxiety, Depression, ADHD.  Patient has historically been able to manage symptoms through therapy.     Patients acute suicide risk was determined to be minimal due to the following factors: Denial of suicidal thoughts, no history of suicide attempts.   Patient denies current thoughts of suicidal ideation and self harm and reports ability to keep self safe.  Patient completed and reviewed safety plan with  and reports ability to follow plan.    Patient is not currently under the influence of alcohol or illicit substances, denies experiencing command hallucinations, and has no immediate access to firearms. Patient reports the following protective factors: dedication to family/friends, safe and stable environment, daily obligations, committment to well-being, sense of personal control or determination and access to a variety of clinical interventions    Patient denies current substance use concerns and reports ability to maintain safety when using substances.    Patient identifies the following concerns with substance use:  Patient reports that they have not liked their past history of use and have made changes.   discussed approaches to manage substance use and discussed substance use treatment history and support group participation.       Patient would benefit from more extensive mental health support such IOP to help mitigate risk of hospitalization or suicidality. Patient is in agreement with plan. Options for treatment and follow-up care were reviewed with the patient. Patient was engaged and actively involved in the decision making process. Patient verbalized understanding of the options discussed and was satisfied with the final plan.    Patient is referred to: IOP.  Patient declined alternative placement options at this time and agreed to reach out to  should this change.   Contact information was provided.      7. JACKY:    JACKY:  Discussed the general effects of drugs and alcohol on health and well-being. Provider gave patient printed information about the  effects of chemical use on their health and well being. Recommendations:  To continue to abstain from all mood altering substances and continue to participate in AA .     8. Records:   These were reviewed at time of assessment.   Information in this assessment was obtained from the medical record and  provided by patient who is a good historian.    Patient will have open access to their mental health medical record.    9.   Interactive Complexity: No      Provider Name/ Credentials:  Annalise Moreno [facility]  2023        LOCUS Worksheet     Name: Jorge Diana MRN: [id number]    : 1970      Gender:  male    PMI:     Provider Name: Annalise Moreno    Provider NPI:  [id number]    Actual level of Care Provided:  Therapy, psychiatry    Service(s) receiving or referred to:  IOP    Reason for Variance: higher level of care      Rating completed by: Annalise Moreno [facility]      I. Risk of Harm:   1      Minimal Risk of Harm    II. Functional Status:   3      Moderate Impairment    III. Co-Morbidity:   2      Minor Co-Morbidity    IV - A. Recovery Environment - Level of Stress:   3      Moderately Stress Environment    IV - B. Recovery Environment - Level of Support:   3      Limited Support in Environment    V. Treatment and Recovery History:   3      Moderate to Equivocal Response to Treatment and Recovery Management    VI. Engagement and Recovery Project:   2      Positive Engagement and Recovery       17 Composite Score    Level of Care Recommendation:   17 to 19       High Intensity Community Based Services

## 2023-06-17 ENCOUNTER — HEALTH MAINTENANCE LETTER (OUTPATIENT)
Age: 53
End: 2023-06-17

## 2024-08-10 ENCOUNTER — HEALTH MAINTENANCE LETTER (OUTPATIENT)
Age: 54
End: 2024-08-10

## 2025-08-16 ENCOUNTER — HEALTH MAINTENANCE LETTER (OUTPATIENT)
Age: 55
End: 2025-08-16

## (undated) RX ORDER — FENTANYL CITRATE 50 UG/ML
INJECTION, SOLUTION INTRAMUSCULAR; INTRAVENOUS
Status: DISPENSED
Start: 2020-03-06